# Patient Record
Sex: FEMALE | Race: WHITE | NOT HISPANIC OR LATINO | Employment: FULL TIME | URBAN - METROPOLITAN AREA
[De-identification: names, ages, dates, MRNs, and addresses within clinical notes are randomized per-mention and may not be internally consistent; named-entity substitution may affect disease eponyms.]

---

## 2023-07-21 ENCOUNTER — TELEPHONE (OUTPATIENT)
Dept: PRIMARY CARE | Facility: CLINIC | Age: 58
End: 2023-07-21
Payer: COMMERCIAL

## 2023-07-21 NOTE — TELEPHONE ENCOUNTER
She is 2 yrs post menopause  Last few months has been spotting  She just had period  Should she be concerned

## 2023-07-24 DIAGNOSIS — N95.0 POST-MENOPAUSAL BLEEDING: Primary | ICD-10-CM

## 2023-07-25 NOTE — TELEPHONE ENCOUNTER
Msg given to patient in detail    Can you call and help get her schd for the US and also given the info to the [facility] please.

## 2023-08-02 ENCOUNTER — TELEPHONE (OUTPATIENT)
Dept: PRIMARY CARE | Facility: CLINIC | Age: 58
End: 2023-08-02
Payer: COMMERCIAL

## 2023-08-02 NOTE — TELEPHONE ENCOUNTER
----- Message from Shanell Hood, DO sent at 8/2/2023  8:35 AM EDT -----  Inner layer/endometrium is abnormally thick for a post menopausal women. You should see a gynecologist and will likely need an endometrial biopsy. Pls let us know if you need help setting up an appointment. I recommedn you been seen within a month.  Thanks,  Shanell Hood

## 2023-09-05 ENCOUNTER — HOSPITAL ENCOUNTER (OUTPATIENT)
Dept: DATA CONVERSION | Facility: HOSPITAL | Age: 58
End: 2023-09-05
Attending: ANESTHESIOLOGY | Admitting: ANESTHESIOLOGY
Payer: COMMERCIAL

## 2023-09-05 DIAGNOSIS — M54.16 RADICULOPATHY, LUMBAR REGION: ICD-10-CM

## 2023-09-05 DIAGNOSIS — M48.061 SPINAL STENOSIS, LUMBAR REGION WITHOUT NEUROGENIC CLAUDICATION: ICD-10-CM

## 2023-09-29 VITALS — HEIGHT: 60 IN | WEIGHT: 180.78 LBS | BODY MASS INDEX: 35.49 KG/M2

## 2023-10-12 RX ORDER — GABAPENTIN 600 MG/1
600 TABLET ORAL ONCE
Status: CANCELLED | OUTPATIENT
Start: 2023-10-12 | End: 2023-10-12

## 2023-10-12 RX ORDER — CELECOXIB 200 MG/1
400 CAPSULE ORAL ONCE
Status: CANCELLED | OUTPATIENT
Start: 2023-10-12 | End: 2023-10-12

## 2023-10-12 RX ORDER — ACETAMINOPHEN 325 MG/1
975 TABLET ORAL ONCE
Status: CANCELLED | OUTPATIENT
Start: 2023-10-12 | End: 2023-10-12

## 2023-10-12 NOTE — HOSPITAL COURSE
58yo presenting for TLH-BSO and sLND for endometrial adenocarcinoma (endometrioid type FIGO 1, MMR proficient and p53 wild type).      Tumor Hx:   23: Underwent endometrial biopsy 2/2 an episode of AUB post menopause.   Pathology indicated Endometrial (endometrioid type) FIGO 1, MMR proficient and wild type p53.     US 23:  IMPRESSION:  1.  Thickened endometrium noted measuring up to 10 mm, could be  related to endometrial hyperplasia, other etiologies cannot be  excluded, recommend gyn consultation.    PMH: Sciatica       Past Surgical History: Cervical Disc replacement, Transvaginal mesh       Family History: Father diagnosed with unknown Colon cancer @ 84 , Mother with   hyst for unknown gynecological lesion, Brother w/ unknown cervical cancer.   Otherwise denies a history of gyn related cancers including ovarian,   endometrial, breast, pancreas, and GI cancers.       Social History: Denies a history of smoking, alcohol use or recreational drug use.  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  Entered menopause at 55 year old.  She   has used OCP for 10-13 years.  She has  never used HRT.       Allergies: NKDA  Meds: none

## 2023-10-12 NOTE — H&P
History Of Present Illness  Candi Varner is a 57 y.o. female presenting for TLH-BSO and sLND for endometrial adenocarcinoma (endometrioid type FIGO 1, MMR proficient and p53 wild type).      Tumor Hx:   23: Underwent endometrial biopsy 2/2 an episode of AUB post menopause.   Pathology indicated Endometrial (endometrioid type) FIGO 1, MMR proficient and wild type p53.     US 23:  IMPRESSION:  1.  Thickened endometrium noted measuring up to 10 mm, could be  related to endometrial hyperplasia, other etiologies cannot be  excluded, recommend gyn consultation.    PMH: Sciatica       Past Surgical History: Cervical Disc replacement, Transvaginal mesh       Family History: Father diagnosed with unknown Colon cancer @ 84 , Mother with   hyst for unknown gynecological lesion, Brother w/ unknown cervical cancer.   Otherwise denies a history of gyn related cancers including ovarian,   endometrial, breast, pancreas, and GI cancers.       Social History: Denies a history of smoking, alcohol use or recreational drug use.  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  Entered menopause at 55 year old.  She   has used OCP for 10-13 years.  She has  never used HRT.       Allergies: NKDA    Meds: none    Social: denies t/e/i    Review of Systems   All other systems reviewed and are negative.       Physical Exam  Constitutional:       General: She is not in acute distress.  HENT:      Head: Normocephalic and atraumatic.      Mouth/Throat:      Mouth: Mucous membranes are moist.   Eyes:      Extraocular Movements: Extraocular movements intact.      Pupils: Pupils are equal, round, and reactive to light.   Cardiovascular:      Rate and Rhythm: Normal rate and regular rhythm.   Pulmonary:      Effort: Pulmonary effort is normal.   Musculoskeletal:         General: Normal range of motion.   Skin:     General: Skin is warm and dry.   Neurological:      General: No  focal deficit present.      Mental Status: She is alert.   Psychiatric:         Mood and Affect: Mood normal.          Last Recorded Vitals  There were no vitals taken for this visit.       Assessment/Plan   57 y.o. female presenting for TLH-BSO and sLND for endometrial adenocarcinoma (endometrioid type FIGO 1, MMR proficient and p53 wild type).        Maureen Kendall MD

## 2023-10-13 ENCOUNTER — LAB (OUTPATIENT)
Dept: LAB | Facility: LAB | Age: 58
End: 2023-10-13
Payer: COMMERCIAL

## 2023-10-13 DIAGNOSIS — C54.1 MALIGNANT NEOPLASM OF ENDOMETRIUM (MULTI): Primary | ICD-10-CM

## 2023-10-13 LAB
ABO GROUP (TYPE) IN BLOOD: NORMAL
ALBUMIN SERPL BCP-MCNC: 4.1 G/DL (ref 3.4–5)
ALP SERPL-CCNC: 52 U/L (ref 33–110)
ALT SERPL W P-5'-P-CCNC: 28 U/L (ref 7–45)
ANION GAP SERPL CALC-SCNC: 14 MMOL/L (ref 10–20)
ANTIBODY SCREEN: NORMAL
AST SERPL W P-5'-P-CCNC: 26 U/L (ref 9–39)
BASOPHILS # BLD AUTO: 0.08 X10*3/UL (ref 0–0.1)
BASOPHILS NFR BLD AUTO: 1.1 %
BILIRUB SERPL-MCNC: 0.6 MG/DL (ref 0–1.2)
BUN SERPL-MCNC: 8 MG/DL (ref 6–23)
CALCIUM SERPL-MCNC: 9.5 MG/DL (ref 8.6–10.3)
CHLORIDE SERPL-SCNC: 104 MMOL/L (ref 98–107)
CO2 SERPL-SCNC: 27 MMOL/L (ref 21–32)
CREAT SERPL-MCNC: 0.93 MG/DL (ref 0.5–1.05)
EOSINOPHIL # BLD AUTO: 0.33 X10*3/UL (ref 0–0.7)
EOSINOPHIL NFR BLD AUTO: 4.7 %
ERYTHROCYTE [DISTWIDTH] IN BLOOD BY AUTOMATED COUNT: 13.6 % (ref 11.5–14.5)
GFR SERPL CREATININE-BSD FRML MDRD: 71 ML/MIN/1.73M*2
GLUCOSE SERPL-MCNC: 89 MG/DL (ref 74–99)
HCT VFR BLD AUTO: 43.6 % (ref 36–46)
HGB BLD-MCNC: 14.5 G/DL (ref 12–16)
IMM GRANULOCYTES # BLD AUTO: 0.03 X10*3/UL (ref 0–0.7)
IMM GRANULOCYTES NFR BLD AUTO: 0.4 % (ref 0–0.9)
LYMPHOCYTES # BLD AUTO: 2.14 X10*3/UL (ref 1.2–4.8)
LYMPHOCYTES NFR BLD AUTO: 30.3 %
MCH RBC QN AUTO: 29.5 PG (ref 26–34)
MCHC RBC AUTO-ENTMCNC: 33.3 G/DL (ref 32–36)
MCV RBC AUTO: 89 FL (ref 80–100)
MONOCYTES # BLD AUTO: 0.66 X10*3/UL (ref 0.1–1)
MONOCYTES NFR BLD AUTO: 9.3 %
NEUTROPHILS # BLD AUTO: 3.82 X10*3/UL (ref 1.2–7.7)
NEUTROPHILS NFR BLD AUTO: 54.2 %
NRBC BLD-RTO: 0 /100 WBCS (ref 0–0)
PLATELET # BLD AUTO: 321 X10*3/UL (ref 150–450)
PMV BLD AUTO: 10.4 FL (ref 7.5–11.5)
POTASSIUM SERPL-SCNC: 4.8 MMOL/L (ref 3.5–5.3)
PROT SERPL-MCNC: 7.1 G/DL (ref 6.4–8.2)
RBC # BLD AUTO: 4.91 X10*6/UL (ref 4–5.2)
RH FACTOR (ANTIGEN D): NORMAL
SODIUM SERPL-SCNC: 140 MMOL/L (ref 136–145)
WBC # BLD AUTO: 7.1 X10*3/UL (ref 4.4–11.3)

## 2023-10-13 PROCEDURE — 86901 BLOOD TYPING SEROLOGIC RH(D): CPT

## 2023-10-13 PROCEDURE — 36415 COLL VENOUS BLD VENIPUNCTURE: CPT

## 2023-10-13 PROCEDURE — 86850 RBC ANTIBODY SCREEN: CPT

## 2023-10-13 PROCEDURE — 80053 COMPREHEN METABOLIC PANEL: CPT

## 2023-10-13 PROCEDURE — 85025 COMPLETE CBC W/AUTO DIFF WBC: CPT

## 2023-10-13 PROCEDURE — 86900 BLOOD TYPING SEROLOGIC ABO: CPT

## 2023-10-14 ENCOUNTER — ANESTHESIA EVENT (OUTPATIENT)
Dept: OPERATING ROOM | Facility: HOSPITAL | Age: 58
End: 2023-10-14
Payer: COMMERCIAL

## 2023-10-16 ENCOUNTER — HOSPITAL ENCOUNTER (OUTPATIENT)
Facility: HOSPITAL | Age: 58
Discharge: HOME | End: 2023-10-17
Attending: STUDENT IN AN ORGANIZED HEALTH CARE EDUCATION/TRAINING PROGRAM | Admitting: STUDENT IN AN ORGANIZED HEALTH CARE EDUCATION/TRAINING PROGRAM
Payer: COMMERCIAL

## 2023-10-16 ENCOUNTER — ANESTHESIA (OUTPATIENT)
Dept: OPERATING ROOM | Facility: HOSPITAL | Age: 58
End: 2023-10-16
Payer: COMMERCIAL

## 2023-10-16 DIAGNOSIS — C54.9 MALIGNANT NEOPLASM OF CORPUS UTERI, UNSPECIFIED (MULTI): ICD-10-CM

## 2023-10-16 DIAGNOSIS — G89.18 POST-OP PAIN: Primary | ICD-10-CM

## 2023-10-16 PROBLEM — Z90.710 STATUS POST HYSTERECTOMY: Status: ACTIVE | Noted: 2023-10-16

## 2023-10-16 LAB — SARS-COV-2 RNA RESP QL NAA+PROBE: NOT DETECTED

## 2023-10-16 PROCEDURE — 99223 1ST HOSP IP/OBS HIGH 75: CPT | Performed by: STUDENT IN AN ORGANIZED HEALTH CARE EDUCATION/TRAINING PROGRAM

## 2023-10-16 PROCEDURE — 96372 THER/PROPH/DIAG INJ SC/IM: CPT | Mod: 59

## 2023-10-16 PROCEDURE — 87635 SARS-COV-2 COVID-19 AMP PRB: CPT | Mod: CMCLAB

## 2023-10-16 PROCEDURE — 3600000004 HC OR TIME - INITIAL BASE CHARGE - PROCEDURE LEVEL FOUR: Performed by: STUDENT IN AN ORGANIZED HEALTH CARE EDUCATION/TRAINING PROGRAM

## 2023-10-16 PROCEDURE — 38570 LAPAROSCOPY LYMPH NODE BIOP: CPT | Performed by: STUDENT IN AN ORGANIZED HEALTH CARE EDUCATION/TRAINING PROGRAM

## 2023-10-16 PROCEDURE — G0378 HOSPITAL OBSERVATION PER HR: HCPCS

## 2023-10-16 PROCEDURE — 2500000004 HC RX 250 GENERAL PHARMACY W/ HCPCS (ALT 636 FOR OP/ED)

## 2023-10-16 PROCEDURE — 3700000002 HC GENERAL ANESTHESIA TIME - EACH INCREMENTAL 1 MINUTE: Performed by: STUDENT IN AN ORGANIZED HEALTH CARE EDUCATION/TRAINING PROGRAM

## 2023-10-16 PROCEDURE — 2580000001 HC RX 258 IV SOLUTIONS

## 2023-10-16 PROCEDURE — 2500000004 HC RX 250 GENERAL PHARMACY W/ HCPCS (ALT 636 FOR OP/ED): Performed by: STUDENT IN AN ORGANIZED HEALTH CARE EDUCATION/TRAINING PROGRAM

## 2023-10-16 PROCEDURE — 88309 TISSUE EXAM BY PATHOLOGIST: CPT | Mod: TC,SUR | Performed by: STUDENT IN AN ORGANIZED HEALTH CARE EDUCATION/TRAINING PROGRAM

## 2023-10-16 PROCEDURE — 7100000002 HC RECOVERY ROOM TIME - EACH INCREMENTAL 1 MINUTE: Performed by: STUDENT IN AN ORGANIZED HEALTH CARE EDUCATION/TRAINING PROGRAM

## 2023-10-16 PROCEDURE — 3700000001 HC GENERAL ANESTHESIA TIME - INITIAL BASE CHARGE: Performed by: STUDENT IN AN ORGANIZED HEALTH CARE EDUCATION/TRAINING PROGRAM

## 2023-10-16 PROCEDURE — A58571 PR LAPAROSCOPY W TOT HYSTERECTUTERUS <=250 GRAM  W TUBE/OVARY: Performed by: ANESTHESIOLOGY

## 2023-10-16 PROCEDURE — 7100000001 HC RECOVERY ROOM TIME - INITIAL BASE CHARGE: Performed by: STUDENT IN AN ORGANIZED HEALTH CARE EDUCATION/TRAINING PROGRAM

## 2023-10-16 PROCEDURE — 94640 AIRWAY INHALATION TREATMENT: CPT | Mod: 59

## 2023-10-16 PROCEDURE — 2500000005 HC RX 250 GENERAL PHARMACY W/O HCPCS

## 2023-10-16 PROCEDURE — 7100000009 HC PHASE TWO TIME - INITIAL BASE CHARGE: Performed by: STUDENT IN AN ORGANIZED HEALTH CARE EDUCATION/TRAINING PROGRAM

## 2023-10-16 PROCEDURE — 2500000001 HC RX 250 WO HCPCS SELF ADMINISTERED DRUGS (ALT 637 FOR MEDICARE OP)

## 2023-10-16 PROCEDURE — 58571 TLH W/T/O 250 G OR LESS: CPT | Performed by: STUDENT IN AN ORGANIZED HEALTH CARE EDUCATION/TRAINING PROGRAM

## 2023-10-16 PROCEDURE — 2500000002 HC RX 250 W HCPCS SELF ADMINISTERED DRUGS (ALT 637 FOR MEDICARE OP, ALT 636 FOR OP/ED)

## 2023-10-16 PROCEDURE — 88307 TISSUE EXAM BY PATHOLOGIST: CPT | Performed by: PATHOLOGY

## 2023-10-16 PROCEDURE — 88309 TISSUE EXAM BY PATHOLOGIST: CPT | Performed by: PATHOLOGY

## 2023-10-16 PROCEDURE — 3600000009 HC OR TIME - EACH INCREMENTAL 1 MINUTE - PROCEDURE LEVEL FOUR: Performed by: STUDENT IN AN ORGANIZED HEALTH CARE EDUCATION/TRAINING PROGRAM

## 2023-10-16 PROCEDURE — 2500000005 HC RX 250 GENERAL PHARMACY W/O HCPCS: Performed by: STUDENT IN AN ORGANIZED HEALTH CARE EDUCATION/TRAINING PROGRAM

## 2023-10-16 PROCEDURE — 2500000001 HC RX 250 WO HCPCS SELF ADMINISTERED DRUGS (ALT 637 FOR MEDICARE OP): Performed by: STUDENT IN AN ORGANIZED HEALTH CARE EDUCATION/TRAINING PROGRAM

## 2023-10-16 PROCEDURE — 7100000010 HC PHASE TWO TIME - EACH INCREMENTAL 1 MINUTE: Performed by: STUDENT IN AN ORGANIZED HEALTH CARE EDUCATION/TRAINING PROGRAM

## 2023-10-16 PROCEDURE — 76942 ECHO GUIDE FOR BIOPSY: CPT | Performed by: STUDENT IN AN ORGANIZED HEALTH CARE EDUCATION/TRAINING PROGRAM

## 2023-10-16 PROCEDURE — A4217 STERILE WATER/SALINE, 500 ML: HCPCS | Performed by: STUDENT IN AN ORGANIZED HEALTH CARE EDUCATION/TRAINING PROGRAM

## 2023-10-16 PROCEDURE — 2720000007 HC OR 272 NO HCPCS: Performed by: STUDENT IN AN ORGANIZED HEALTH CARE EDUCATION/TRAINING PROGRAM

## 2023-10-16 RX ORDER — ROCURONIUM BROMIDE 10 MG/ML
INJECTION, SOLUTION INTRAVENOUS AS NEEDED
Status: DISCONTINUED | OUTPATIENT
Start: 2023-10-16 | End: 2023-10-16

## 2023-10-16 RX ORDER — ONDANSETRON HYDROCHLORIDE 2 MG/ML
4 INJECTION, SOLUTION INTRAVENOUS ONCE AS NEEDED
Status: DISCONTINUED | OUTPATIENT
Start: 2023-10-16 | End: 2023-10-16 | Stop reason: HOSPADM

## 2023-10-16 RX ORDER — SODIUM CHLORIDE, SODIUM LACTATE, POTASSIUM CHLORIDE, CALCIUM CHLORIDE 600; 310; 30; 20 MG/100ML; MG/100ML; MG/100ML; MG/100ML
100 INJECTION, SOLUTION INTRAVENOUS CONTINUOUS
Status: DISCONTINUED | OUTPATIENT
Start: 2023-10-16 | End: 2023-10-16 | Stop reason: HOSPADM

## 2023-10-16 RX ORDER — OXYCODONE HYDROCHLORIDE 5 MG/1
5 TABLET ORAL EVERY 4 HOURS PRN
Status: DISCONTINUED | OUTPATIENT
Start: 2023-10-16 | End: 2023-10-16 | Stop reason: HOSPADM

## 2023-10-16 RX ORDER — ACETAMINOPHEN 325 MG/1
975 TABLET ORAL ONCE
Status: COMPLETED | OUTPATIENT
Start: 2023-10-16 | End: 2023-10-16

## 2023-10-16 RX ORDER — MIDAZOLAM HYDROCHLORIDE 1 MG/ML
INJECTION INTRAMUSCULAR; INTRAVENOUS AS NEEDED
Status: DISCONTINUED | OUTPATIENT
Start: 2023-10-16 | End: 2023-10-16

## 2023-10-16 RX ORDER — GABAPENTIN 600 MG/1
600 TABLET ORAL ONCE
Status: COMPLETED | OUTPATIENT
Start: 2023-10-16 | End: 2023-10-16

## 2023-10-16 RX ORDER — ONDANSETRON HYDROCHLORIDE 2 MG/ML
INJECTION, SOLUTION INTRAVENOUS AS NEEDED
Status: DISCONTINUED | OUTPATIENT
Start: 2023-10-16 | End: 2023-10-16

## 2023-10-16 RX ORDER — ACETAMINOPHEN 325 MG/1
650 TABLET ORAL EVERY 6 HOURS PRN
Qty: 20 TABLET | Refills: 0 | Status: SHIPPED | OUTPATIENT
Start: 2023-10-16 | End: 2023-10-26

## 2023-10-16 RX ORDER — HYDROMORPHONE HYDROCHLORIDE 1 MG/ML
0.2 INJECTION, SOLUTION INTRAMUSCULAR; INTRAVENOUS; SUBCUTANEOUS EVERY 5 MIN PRN
Status: DISCONTINUED | OUTPATIENT
Start: 2023-10-16 | End: 2023-10-16 | Stop reason: HOSPADM

## 2023-10-16 RX ORDER — ALBUTEROL SULFATE 0.83 MG/ML
2.5 SOLUTION RESPIRATORY (INHALATION) ONCE AS NEEDED
Status: COMPLETED | OUTPATIENT
Start: 2023-10-16 | End: 2023-10-16

## 2023-10-16 RX ORDER — SODIUM CHLORIDE 0.9 G/100ML
IRRIGANT IRRIGATION AS NEEDED
Status: DISCONTINUED | OUTPATIENT
Start: 2023-10-16 | End: 2023-10-16 | Stop reason: HOSPADM

## 2023-10-16 RX ORDER — CELECOXIB 200 MG/1
400 CAPSULE ORAL ONCE
Status: COMPLETED | OUTPATIENT
Start: 2023-10-16 | End: 2023-10-16

## 2023-10-16 RX ORDER — DEXAMETHASONE SODIUM PHOSPHATE 4 MG/ML
INJECTION, SOLUTION INTRA-ARTICULAR; INTRALESIONAL; INTRAMUSCULAR; INTRAVENOUS; SOFT TISSUE AS NEEDED
Status: DISCONTINUED | OUTPATIENT
Start: 2023-10-16 | End: 2023-10-16

## 2023-10-16 RX ORDER — LIDOCAINE HYDROCHLORIDE 20 MG/ML
INJECTION, SOLUTION INFILTRATION; PERINEURAL AS NEEDED
Status: DISCONTINUED | OUTPATIENT
Start: 2023-10-16 | End: 2023-10-16

## 2023-10-16 RX ORDER — IBUPROFEN 600 MG/1
600 TABLET ORAL EVERY 6 HOURS PRN
Qty: 20 TABLET | Refills: 0 | Status: SHIPPED | OUTPATIENT
Start: 2023-10-16 | End: 2023-10-26

## 2023-10-16 RX ORDER — CEFAZOLIN SODIUM 1 G/50ML
1 SOLUTION INTRAVENOUS ONCE
Status: COMPLETED | OUTPATIENT
Start: 2023-10-16 | End: 2023-10-16

## 2023-10-16 RX ORDER — HEPARIN SODIUM 5000 [USP'U]/ML
5000 INJECTION, SOLUTION INTRAVENOUS; SUBCUTANEOUS ONCE
Status: COMPLETED | OUTPATIENT
Start: 2023-10-16 | End: 2023-10-16

## 2023-10-16 RX ORDER — HYDRALAZINE HYDROCHLORIDE 20 MG/ML
5 INJECTION INTRAMUSCULAR; INTRAVENOUS EVERY 30 MIN PRN
Status: DISCONTINUED | OUTPATIENT
Start: 2023-10-16 | End: 2023-10-16 | Stop reason: HOSPADM

## 2023-10-16 RX ORDER — INDOCYANINE GREEN AND WATER 25 MG
KIT INJECTION AS NEEDED
Status: DISCONTINUED | OUTPATIENT
Start: 2023-10-16 | End: 2023-10-16 | Stop reason: HOSPADM

## 2023-10-16 RX ORDER — PHENYLEPHRINE 10 MG/250 ML(40 MCG/ML)IN 0.9 % SOD.CHLORIDE INTRAVENOUS
CONTINUOUS PRN
Status: DISCONTINUED | OUTPATIENT
Start: 2023-10-16 | End: 2023-10-16

## 2023-10-16 RX ORDER — FENTANYL CITRATE 50 UG/ML
INJECTION, SOLUTION INTRAMUSCULAR; INTRAVENOUS AS NEEDED
Status: DISCONTINUED | OUTPATIENT
Start: 2023-10-16 | End: 2023-10-16

## 2023-10-16 RX ORDER — HYDROMORPHONE HYDROCHLORIDE 1 MG/ML
INJECTION, SOLUTION INTRAMUSCULAR; INTRAVENOUS; SUBCUTANEOUS AS NEEDED
Status: DISCONTINUED | OUTPATIENT
Start: 2023-10-16 | End: 2023-10-16

## 2023-10-16 RX ORDER — PROPOFOL 10 MG/ML
INJECTION, EMULSION INTRAVENOUS AS NEEDED
Status: DISCONTINUED | OUTPATIENT
Start: 2023-10-16 | End: 2023-10-16

## 2023-10-16 RX ORDER — WATER 1 ML/ML
IRRIGANT IRRIGATION AS NEEDED
Status: DISCONTINUED | OUTPATIENT
Start: 2023-10-16 | End: 2023-10-16 | Stop reason: HOSPADM

## 2023-10-16 RX ORDER — HYDROMORPHONE HYDROCHLORIDE 1 MG/ML
0.5 INJECTION, SOLUTION INTRAMUSCULAR; INTRAVENOUS; SUBCUTANEOUS EVERY 5 MIN PRN
Status: DISCONTINUED | OUTPATIENT
Start: 2023-10-16 | End: 2023-10-16 | Stop reason: HOSPADM

## 2023-10-16 RX ORDER — LABETALOL HYDROCHLORIDE 5 MG/ML
5 INJECTION, SOLUTION INTRAVENOUS ONCE AS NEEDED
Status: DISCONTINUED | OUTPATIENT
Start: 2023-10-16 | End: 2023-10-16 | Stop reason: HOSPADM

## 2023-10-16 RX ORDER — INDOCYANINE GREEN AND WATER 25 MG
5 KIT INJECTION ONCE
Status: CANCELLED | OUTPATIENT
Start: 2023-10-16 | End: 2023-10-16

## 2023-10-16 RX ORDER — SODIUM CHLORIDE, SODIUM LACTATE, POTASSIUM CHLORIDE, CALCIUM CHLORIDE 600; 310; 30; 20 MG/100ML; MG/100ML; MG/100ML; MG/100ML
INJECTION, SOLUTION INTRAVENOUS CONTINUOUS PRN
Status: DISCONTINUED | OUTPATIENT
Start: 2023-10-16 | End: 2023-10-16

## 2023-10-16 RX ORDER — TRAMADOL HYDROCHLORIDE 50 MG/1
50 TABLET ORAL EVERY 6 HOURS PRN
Qty: 12 TABLET | Refills: 0 | Status: SHIPPED | OUTPATIENT
Start: 2023-10-16 | End: 2023-10-23

## 2023-10-16 RX ORDER — PHENYLEPHRINE HCL IN 0.9% NACL 0.4MG/10ML
SYRINGE (ML) INTRAVENOUS AS NEEDED
Status: DISCONTINUED | OUTPATIENT
Start: 2023-10-16 | End: 2023-10-16

## 2023-10-16 RX ADMIN — Medication 40 MCG: at 14:15

## 2023-10-16 RX ADMIN — SODIUM CHLORIDE, POTASSIUM CHLORIDE, SODIUM LACTATE AND CALCIUM CHLORIDE: 600; 310; 30; 20 INJECTION, SOLUTION INTRAVENOUS at 12:37

## 2023-10-16 RX ADMIN — ACETAMINOPHEN 975 MG: 325 TABLET ORAL at 10:41

## 2023-10-16 RX ADMIN — Medication 80 MCG: at 13:38

## 2023-10-16 RX ADMIN — ROCURONIUM BROMIDE 20 MG: 10 INJECTION, SOLUTION INTRAVENOUS at 13:44

## 2023-10-16 RX ADMIN — SODIUM CHLORIDE, POTASSIUM CHLORIDE, SODIUM LACTATE AND CALCIUM CHLORIDE 100 ML/HR: 600; 310; 30; 20 INJECTION, SOLUTION INTRAVENOUS at 16:00

## 2023-10-16 RX ADMIN — Medication 120 MCG: at 13:57

## 2023-10-16 RX ADMIN — Medication 80 MCG: at 13:10

## 2023-10-16 RX ADMIN — PROPOFOL 130 MG: 10 INJECTION, EMULSION INTRAVENOUS at 12:56

## 2023-10-16 RX ADMIN — CELECOXIB 400 MG: 200 CAPSULE ORAL at 10:41

## 2023-10-16 RX ADMIN — Medication 0.2 MCG/KG/MIN: at 14:10

## 2023-10-16 RX ADMIN — DEXAMETHASONE SODIUM PHOSPHATE 4 MG: 4 INJECTION, SOLUTION INTRAMUSCULAR; INTRAVENOUS at 12:58

## 2023-10-16 RX ADMIN — Medication 40 MCG: at 15:42

## 2023-10-16 RX ADMIN — HYDROMORPHONE HYDROCHLORIDE 0.4 MG: 1 INJECTION, SOLUTION INTRAMUSCULAR; INTRAVENOUS; SUBCUTANEOUS at 15:37

## 2023-10-16 RX ADMIN — GABAPENTIN 600 MG: 600 TABLET, FILM COATED ORAL at 10:30

## 2023-10-16 RX ADMIN — Medication 120 MCG: at 15:45

## 2023-10-16 RX ADMIN — Medication 120 MCG: at 13:14

## 2023-10-16 RX ADMIN — LIDOCAINE HYDROCHLORIDE 60 ML: 20 INJECTION, SOLUTION INFILTRATION; PERINEURAL at 12:55

## 2023-10-16 RX ADMIN — Medication 80 MCG: at 13:51

## 2023-10-16 RX ADMIN — LIDOCAINE HYDROCHLORIDE 40 ML: 20 INJECTION, SOLUTION INFILTRATION; PERINEURAL at 12:56

## 2023-10-16 RX ADMIN — HYDROMORPHONE HYDROCHLORIDE 0.2 MG: 1 INJECTION, SOLUTION INTRAMUSCULAR; INTRAVENOUS; SUBCUTANEOUS at 17:04

## 2023-10-16 RX ADMIN — HYDROMORPHONE HYDROCHLORIDE 0.4 MG: 1 INJECTION, SOLUTION INTRAMUSCULAR; INTRAVENOUS; SUBCUTANEOUS at 14:53

## 2023-10-16 RX ADMIN — Medication 200 MCG: at 14:03

## 2023-10-16 RX ADMIN — ROCURONIUM BROMIDE 5 MG: 10 INJECTION, SOLUTION INTRAVENOUS at 15:18

## 2023-10-16 RX ADMIN — Medication 6 L/MIN: at 16:00

## 2023-10-16 RX ADMIN — FENTANYL CITRATE 50 MCG: 50 INJECTION, SOLUTION INTRAMUSCULAR; INTRAVENOUS at 12:54

## 2023-10-16 RX ADMIN — MIDAZOLAM HYDROCHLORIDE 2 MG: 1 INJECTION, SOLUTION INTRAMUSCULAR; INTRAVENOUS at 12:37

## 2023-10-16 RX ADMIN — Medication 120 MCG: at 14:19

## 2023-10-16 RX ADMIN — ONDANSETRON 4 MG: 2 INJECTION INTRAMUSCULAR; INTRAVENOUS at 15:42

## 2023-10-16 RX ADMIN — HEPARIN SODIUM 5000 UNITS: 5000 INJECTION, SOLUTION INTRAVENOUS; SUBCUTANEOUS at 10:41

## 2023-10-16 RX ADMIN — ROCURONIUM BROMIDE 10 MG: 10 INJECTION, SOLUTION INTRAVENOUS at 14:36

## 2023-10-16 RX ADMIN — Medication 240 MCG: at 15:48

## 2023-10-16 RX ADMIN — ALBUTEROL SULFATE 2.5 MG: 2.5 SOLUTION RESPIRATORY (INHALATION) at 18:03

## 2023-10-16 RX ADMIN — ROCURONIUM BROMIDE 55 MG: 10 INJECTION, SOLUTION INTRAVENOUS at 12:57

## 2023-10-16 RX ADMIN — SUGAMMADEX 200 MG: 100 INJECTION, SOLUTION INTRAVENOUS at 15:42

## 2023-10-16 RX ADMIN — Medication 3 L/MIN: at 19:15

## 2023-10-16 RX ADMIN — CEFAZOLIN SODIUM 2 G: 1 INJECTION, SOLUTION INTRAVENOUS at 13:16

## 2023-10-16 SDOH — HEALTH STABILITY: MENTAL HEALTH: CURRENT SMOKER: 0

## 2023-10-16 ASSESSMENT — PAIN SCALES - GENERAL
PAINLEVEL_OUTOF10: 3
PAINLEVEL_OUTOF10: 3
PAINLEVEL_OUTOF10: 0 - NO PAIN
PAINLEVEL_OUTOF10: 2
PAINLEVEL_OUTOF10: 3
PAINLEVEL_OUTOF10: 5 - MODERATE PAIN
PAINLEVEL_OUTOF10: 2
PAINLEVEL_OUTOF10: 3

## 2023-10-16 ASSESSMENT — COLUMBIA-SUICIDE SEVERITY RATING SCALE - C-SSRS
1. IN THE PAST MONTH, HAVE YOU WISHED YOU WERE DEAD OR WISHED YOU COULD GO TO SLEEP AND NOT WAKE UP?: NO
2. HAVE YOU ACTUALLY HAD ANY THOUGHTS OF KILLING YOURSELF?: NO
6. HAVE YOU EVER DONE ANYTHING, STARTED TO DO ANYTHING, OR PREPARED TO DO ANYTHING TO END YOUR LIFE?: NO

## 2023-10-16 ASSESSMENT — PAIN - FUNCTIONAL ASSESSMENT
PAIN_FUNCTIONAL_ASSESSMENT: 0-10
PAIN_FUNCTIONAL_ASSESSMENT: UNABLE TO SELF-REPORT
PAIN_FUNCTIONAL_ASSESSMENT: 0-10

## 2023-10-16 NOTE — ANESTHESIA POSTPROCEDURE EVALUATION
Patient: Candi Varner    Procedure Summary       Date: 10/16/23 Room / Location: [facility] OR 03 / [facility]    Anesthesia Start: 1237 Anesthesia Stop: 1607    Procedure: Total laparoscopic hysterectomy; bilateral salpingo oophorectomy; sentinel nodes - roderick/nc 9/25 Diagnosis:       Malignant neoplasm of corpus uteri, unspecified (CMS/HCC)      (Malignant neoplasm of corpus uteri, unspecified (CMS/HCC) [C54.9])    Surgeons: Tonya Martinez MD Responsible Provider: Grace Calvin MD    Anesthesia Type: general ASA Status: 2            Anesthesia Type: general    Vitals Value Taken Time   /65 10/16/23 1607   Temp 36.1 10/16/23 1607   Pulse 75 10/16/23 1607   Resp 14 10/16/23 1607   SpO2 99% 10/16/23 1607       Anesthesia Post Evaluation    Patient location during evaluation: PACU  Patient participation: complete - patient participated  Level of consciousness: sleepy but conscious  Pain management: adequate  Airway patency: patent  Cardiovascular status: acceptable  Respiratory status: acceptable  Hydration status: acceptable        No notable events documented.

## 2023-10-16 NOTE — CONSULTS
Consults  Acute Pain Service    Candi Varner is a 57 y.o. year old female patient presenting for total laparoscopic hysterectomy BSO with Dr. Martinez 10/16/23.    PMH:  Endometrial adenocarcinoma, sciatica     PSH:  Cervical disc replacement    FHx:  cancer    SHx:  Denies smoking, EtOH, illicit drug use    Allergies: NDKA    Review of Systems  Gen: No fatigue, anorexia, insomnia, fever.   Eyes: No vision loss, double vision, drainage, eye pain.   ENT: No pharyngitis, dry mouth, no hearing changes or ear discharge  Cardiac: No chest pain, palpitations, syncope, near syncope.   Pulmonary: No shortness of breath, cough, hemoptysis.   Heme/lymph: No swollen glands, fever, bleeding.   GI: No abdominal pain, change in bowel habits, melena, hematemesis, hematochezia, nausea, vomiting, diarrhea.   : No discharge, dysuria, frequency, urgency, hematuria.  Endo: No polyuria or weight loss.   Musculoskeletal: Negative for any pain or loss of ROM/weakness  Skin: No rashes or lesions  Neuro: Normal speech, no numbness or weakness. No gait difficulties  Review of systems is otherwise negative unless stated above or in history of present illness.    Physical Exam:  Constitutional:  no distress, alert and cooperative  Eyes: clear sclera  Head/Neck: No apparent injury, trachea midline  Respiratory/Thorax: Patent airways, thorax symmetric, breathing comfortably  Cardiovascular: no pitting edema  Gastrointestinal: Nondistended, obese  Musculoskeletal: ROM intact  Extremities: no clubbing  Neurological: alert, jo x4  Psychological: Appropriate affect    No results found for this or any previous visit (from the past 24 hour(s)).     Candi Varner is a 57 y.o. year old female patient presenting for total laparoscopic hysterectomy BSO with Dr. Martinez 10/16/23.    PLAN  -Bilateral quadratus lumborum single shot nerve blocks performed preoperatively.   -Rest of pain medications per primary service  -Acute pain service will see on  POD1 if inpatient    Acute Pain Service Resident  pg [phone]  [phone]

## 2023-10-16 NOTE — ANESTHESIA PROCEDURE NOTES
Peripheral IV  Date/Time: 10/16/2023 1:01 PM  Inserted by: AFIA Adrian    Placement  Needle size: 18 G  Laterality: right  Location: hand  Site prep: alcohol  Technique: anatomical landmarks  Attempts: 1

## 2023-10-16 NOTE — DISCHARGE INSTRUCTIONS
Laparoscopic Hysterectomy Discharge Instructions  If you have any questions about your care, please contact us at [phone].    Medications and Pain Management  Common areas of pain after laparoscopic hysterectomy include the incision pain, pain in between your shoulder blades, the pelvis and lower back. The gas that was used to distend your abdomen for the surgery is absorbed slowly into your blood stream over the first 3-4 days after surgery. It is not passed intestinally, although, because your abdomen is distended, it may feel similar to intestinal gas. Staying active and walking is the best way to promote the absorption of this gas.  Immediately after surgery, nerve pain is the most intense, typically for the first 6 to 12 hours. As the body heals, it creates inflammation around the incisions sites adding pressure and creating soreness. After 5 days, the inflammation begins to recede and significant improvement in soreness is expected. Pulling on the incisions, especially if sudden, such as when you cough, will reactivate the nerve pain. Support your abdomen with a pillow during coughing or sneezing as this will be helpful to minimize pain. There are two types of pain pills typically used for post-operative pain management, narcotics such as tramadol and an anti-inflammatory such as Ibuprofen or Naprosyn.  Taking regular anti-inflammatory pills, such as 3 200mg liquid gel caps of Ibuprofen every 6 hours for the first 5 days and then as needed is recommended. You can alternate ibuprofen with tylenol (650mg). The tylenol can be taken every 6 hours.  If you have problems using NSAIDs, be sure to discuss this with your doctor. The narcotic can be used on a schedule for the first 1 to 2 days but after that, only as you need it. Narcotics can cause constipation, nausea, sleepiness and headaches. You may begin your usual home medications as you were taking before unless directed by your doctor.    Incisional  care  Paper tape steri-strips are typically used for the abdominal incisions. The steri-strips can be pulled off after several days or at your first post-operative appointment. You may shower and use a mild soap around the incisions and pat dry. Do not use a washcloth or scrub the incisions. Using peroxide or antiseptics is not recommended for routine care. Avoid hot and steamy showers as this may cause you to feel faint. No tub baths for six weeks following surgery. There may be discoloration or bruising around the incisions. This is normal and may take several weeks to resolve. Firmness or a nodular area under the skin near the incision may represent a collection of blood, this too will resolve on its own after a little time. If any incision develops tenderness, redness in the skin layer or has drainage please call the office.    Vaginal Discharge  You may have a mildly malodorous discharge and occasional spotting for up to 6 weeks. Menstrual level bleeding or significant watery discharge is not expected. Put nothing in the vagina like tampons or sexual intercourse for 6 weeks after surgery.     GI Function  Nausea can occasionally be an issue in the first few days after surgery. It is usually caused as a side effect from the pain medicine, particularly narcotics. Taking the pain pills with food is a food way to proactively minimize this. Throwing up, especially after the first day, is not expected and if this happens, you should call your doctor. Feeling gassy and constipation can be a problem for the first week after surgery. Limiting the use of narcotics may be helpful. Stool softeners twice a day and a high fiber diet are safe. If needed, Miralax once daily is a good choice. If no bowel movement after 3 days, you will need to increase the Miralax until soft, regular bowel movements are passing.    Voiding  Because the bladder is disturbed by the surgery, the normal sensation may be temporarily altered. You may  not be aware that your bladder is full. If the bladder is allowed to get over distended, it may make the problem worse. This is why we make sure that you are able to empty your bladder adequately before you go home. For the first few days at home, you should make a point to empty your bladder every 3 to 4 hours. If you had a sling placed, it may take longer to empty your bladder and you may have to tilt forward while voiding. After a few weeks, voiding feels much more natural. Pain with voiding, especially after the first day, is not expected and may represent a bladder infection.    Activity  For the first two days post-operatively, your soreness and recovery from anesthesia will limit your activity. At a minimum during this time, you should walk around for 10-15 minutes every 2-3 hours. After that, in the first week, any activity except for overt exercise is ok. Stairs are safe, just take your time. During the first week you should not commit to being on your feet for more than 30 minutes at a time. During the second week, light exercise is encouraged. After 2 weeks from surgery, you should try to get back into regular activity other than heavy lifting. For healing, please limit the amount of weight lifted to 8-10 pounds for the first 6 weeks after surgery. Driving is ok after the first few days as long as you are not taking narcotic pain medications and your pain is well controlled.    When to Call the Doctor  Call for any fever above 100.4 F (If you do not feel feverish you do not have to routinely  check your temperature.  Call for severe pain not improved by medications  Call for persistent nausea, vomiting  Call for menstrual level or more vaginal bleeding or significant vaginal discharge  Call for unusual swelling in your legs  Call if the incisions develop painful redness and discharge.

## 2023-10-16 NOTE — OP NOTE
Date: 10/16/2023  OR Location: [facility] OR    Name: Candi Varner, : 1965, Age: 57 y.o., MRN: [id number], Sex: female    Diagnosis  Pre-op Diagnosis     * Malignant neoplasm of corpus uteri, unspecified (CMS/HCC) [C54.9] Post-op Diagnosis     * Malignant neoplasm of corpus uteri, unspecified (CMS/HCC) [C54.9]     Procedures    * Total laparoscopic hysterectomy; bilateral salpingo oophorectomy; sentinel nodes - roderick/nc     Surgeons      * Tonya Martinez - Primary    Resident/Fellow/Other Assistant:  Fellow: Khanh Enamorado MD  Surgical Resident: Maria Antonia Garcia MD; Laly Centeno MD    Procedure Summary  Anesthesia: General  ASA: II  Anesthesia Staff: Anesthesiologist: Grace Calvin MD  Anesthesia Resident: Vimal Melendez MD  Estimated Blood Loss: 50mL  Intra-op Medications:   Medication Name Total Dose   surgical lubricant gel 1 Application   sterile water irrigation solution 1,000 mL   indocyanine green (IC-Green) injection 25 mg   acetaminophen (Tylenol) tablet 975 mg 975 mg   ceFAZolin in dextrose (iso-os) (Ancef) IVPB 1 g 2 g   celecoxib (CeleBREX) capsule 400 mg 400 mg   heparin (porcine) injection 5,000 Units 5,000 Units              Anesthesia Record               Intraprocedure I/O Totals          Intake    Phenylephrine Drip 0.00 mL    The total shown is the total volume documented since Anesthesia Start was filed.    lactated Ringer's 2200.00 mL    Total Intake 2200 mL       Output    Urine 575 mL    Est. Blood Loss 50 mL    Total Output 625 mL       Net    Net Volume 1575 mL          Specimen:   ID Type Source Tests Collected by Time   1 : RIGHT PELVIC SENTINEL LYMPH NODE Tissue SENTINEL LYMPH NODE, PELVIC SURGICAL PATHOLOGY EXAM Tonya Martinez MD 10/16/2023 1359   2 : LEFT PELVIC SENTINEL LYMPH NODE Tissue SENTINEL LYMPH NODE, PELVIC SURGICAL PATHOLOGY EXAM Tonya Martinez MD 10/16/2023 1415   3 : UTERUS, CERVIX, BILATERAL TUBES AND OVARIES Tissue UTERUS, CERVIX,  FALLOPIAN TUBES AND OVARIES BILATERAL SURGICAL PATHOLOGY EXAM Tonya Martinez MD 10/16/2023 0818        Staff:   Circulator: Little Montague RN; Tanya Ingram RN  Relief Circulator: Shanell Nathan RN  Scrub Person: Ethel Ernandez         Procedure Details:  The patient was seen in the preoperative area. The site of surgery was properly noted/marked if necessary per policy. The patient has been actively warmed in preoperative area. Preoperative antibiotics have been ordered and given within 1 hours of incision. Venous thrombosis prophylaxis have been ordered including bilateral sequential compression devices and chemical prophylaxis    Findings: normal appearing uterus, bilateral fallopian tubes and ovaries. Bilateral mapping of sentinel lymph nodes to the obturator space. Smooth peritoneal surfaces, normal liver edge.     Complications:  None; patient tolerated the procedure well.     Disposition: PACU - hemodynamically stable.  Condition: stable    After informed consent was confirmed, the patient was taken to the operating room with an IV in place. A preoperative Huddle and timeout were performed, with all OR personnel confirming correct patient and procedure. The patient was moved to the operating room table and SCDs were placed.  General anesthesia was induced. She was then placed in the dorsal lithotomy position on the operating room table using Dimas stirrups. She was prepped and draped in a normal sterile fashion for a laparoscopic hysterectomy. A surgical pause was performed. The patient's identity and surgical procedure were again confirmed by all the surgical personnel. The patient received preoperative antibiotics.    A guillen catheter was placed. We then started with the vaginal portion of the operation. A bivalve speculum was placed in the vagina, and the cervix was visualized.  Indocyanine green dye was then injected at 3 o’clock and 9 o’clock, 1cc superficially and 1cc deep at each  location, for a total of 4cc.  A [employer] system was then placed as a uterine manipulator.  The speculum was then removed.      We then turned our attention to the laparoscopic portion of the operation after donning new sterile gloves. The skin superior to the supraumbilical area was marked. A 12mm horizontal skin incision was made. This was carried down to the level of the fascia with two S retractors. The fascia was elevated with 2 kocher clamps and incised with a scalpel.  Both sides of the fascia were then tagged with 0-vicryl suture.  The peritoneum was then elevated with 2 vishal clamps and was entered bluntly.  The jaya port was then placed. CO2 was then insufflated into the abdomen.     Once this was accomplished, we then carefully inspected the abdomen and there was no evidence of injury. The patient was placed in deep Trendelenburg. We then placed three 5-mm accessory ports.  All were placed under direct visualization.  The small bowel was manipulated out of the pelvis.     We then toggled back and forth from regular view to firefly mode to perform a bilateral sentinel pelvic lymph node dissection. The uterus was placed on traction. The round ligaments were sealed and divided with the bovie device. The broad ligament was dissected cephalad and caudad. The parametria appeared free of disease bilaterally. Lymph nodes were then visualized bilaterally and harvested off of the bilateral external iliac vessels. Hemostasis was secure. We carefully inspected the areas and there was no evidence of bleeding. The obturator and genitofemoral nerves were  conserved.  The lymph nodes were removed through the laparoscopic port and sent to pathology for permanent section.     We then proceeded with the hysterectomy.   The infundibulopelvic ligaments were then isolated away from the ureters which were well visualized bilaterally.  A window was created between the two and the IP ligaments were cauterized and then divided  with the ligasure device.      The anterior and posterior leaves of the broad ligament were dissected.   At the vesicouterine fold the peritoneum was incised transversely and the bladder sharply dissected off the lower uterine segment and upper vagina.  The posterior peritoneum was gently brought down from the uterus.  The uterine vessels were skeletonized then cauterized and divided with the ligasure device.      We then began the colpotomy. Monopolar bovie was used and the cervix was released circumferentially over the cervical cup.  The uterus, tubes and ovaries and cervix were delivered transvaginally without difficulty.       The cuff was closed laparoscopically with 0 Maxon v-loc suture.  Hemostasis was achieved.  The abdomen was copiously irrigated.  Lawson was then placed in the lymph node beds and along the vaginal cuff.  Hemostasis was noted.     The 5mm ports were all removed under direct visualization.  The 12mm port was removed and the fascia was then closed with running suture of 0-vicryl followed by tying the two previously-tagged end of fascia together. Hemostasis was noted.  Ports were closed using 4-0 vicryl in a subcuticular fashion, followed by steristrips.      The guillen was removed and the balloon was removed from the vagina.     Sponge, needle, instrument counts were correct x 2.    I was present for the entirety of the procedure(s).     Tonya Martinez MD

## 2023-10-16 NOTE — PROCEDURES
Peripheral Block    Patient location during procedure: pre-op  Start time: 10/16/2023 11:49 AM  End time: 10/16/2023 11:56 AM  Reason for block: at surgeon's request  Staffing  Performed: resident   Authorized by: Donna Hernandez DO    Performed by: Donna Hernandez DO  Preanesthetic Checklist  Completed: patient identified, IV checked, site marked, risks and benefits discussed, surgical consent, monitors and equipment checked, pre-op evaluation and timeout performed   Timeout performed at: 10/16/2023 11:48 AM  Peripheral Block  Patient position: laying flat  Prep: ChloraPrep  Patient monitoring: heart rate and continuous pulse ox  Block type: QL  Laterality: B/L  Injection technique: single-shot  Guidance: ultrasound guided  Local infiltration: lidocaine  Needle  Needle type: Tuohy   Needle gauge: 26 G  Needle length: 8 cm  Needle localization: ultrasound guidance     image stored in chart  Assessment  Injection assessment: negative aspiration for heme, no paresthesia on injection, incremental injection and local visualized surrounding nerve on ultrasound  Additional Notes  QL single shot. informed consent obtained. risks and benefits discussed. ASA monitors placed, timeout performed. Pt positioned, prepped with chlorhexidine, draped with sterile towels. Ultrasound guidance used with visualization of the needle throughout duration of the procedure. Aspiration was negative. A total of 30 cc 0.5% ropivacaine, 100mcg epinephrine, and 4mg decadron injected between both sides. Patient tolerated procedure well.     Timeout by JOSE Barnard

## 2023-10-16 NOTE — ANESTHESIA PREPROCEDURE EVALUATION
Patient: Candi Varner    Procedure Information       Anesthesia Start Date/Time: 10/16/23 1209    Procedure: Total laparoscopic hysterectomy; bilateral salpingo oophorectomy; sentinel nodes - roderick/nc 9/25    Location: [facility] OR 03 / Virtual [facility] OR    Surgeons: Tonya Martinez MD            Relevant Problems   Anesthesia (within normal limits)      Cardiovascular (within normal limits)      Endocrine (within normal limits)      GI (within normal limits)      /Renal (within normal limits)      Pulmonary (within normal limits)      GI/Hepatic (within normal limits)      Musculoskeletal (within normal limits)      Eyes, Ears, Nose, and Throat (within normal limits)      Infectious Disease (within normal limits)       Clinical information reviewed:   Tobacco  Allergies  Meds   Med Hx  Surg Hx  OB Status  Fam Hx  Soc   Hx        NPO Detail:  NPO/Void Status  Carbonhydrate Drink Given Prior to Surgery? : N  Date of Last Liquid: 10/16/23  Time of Last Liquid: 0000  Date of Last Solid: 10/16/23  Time of Last Solid: 0000  Last Intake Type: Clear fluids  Time of Last Void: 1000         Physical Exam    Airway  Mallampati: III  TM distance: >3 FB  Neck ROM: full     Cardiovascular - normal exam     Dental    Pulmonary - normal exam     Abdominal            Anesthesia Plan    ASA 2     general     The patient is not a current smoker.    intravenous induction   Postoperative administration of opioids is intended.  Anesthetic plan and risks discussed with patient and spouse.  Use of blood products discussed with patient and spouse who.    Plan discussed with resident and attending.

## 2023-10-16 NOTE — ANESTHESIA PROCEDURE NOTES
Airway  Date/Time: 10/16/2023 12:58 PM  Urgency: elective    Airway not difficult    Staffing  Performed: resident   Authorized by: Grace Calvin MD    Performed by: Vimal Melendez MD  Patient location during procedure: OR    Indications and Patient Condition  Indications for airway management: anesthesia and airway protection  Spontaneous Ventilation: absent  Sedation level: deep  Preoxygenated: yes  Patient position: sniffing  Mask difficulty assessment: 2 - vent by mask + OA or adjuvant +/- NMBA    Final Airway Details  Final airway type: endotracheal airway      Successful airway: ETT  Cuffed: yes   Successful intubation technique: video laryngoscopy  Facilitating devices/methods: intubating stylet  Endotracheal tube insertion site: oral  Blade size: #3  ETT size (mm): 7.0  Cormack-Lehane Classification: grade I - full view of glottis  Placement verified by: chest auscultation and capnometry   Measured from: lips  ETT to lips (cm): 22  Number of attempts at approach: 1

## 2023-10-17 VITALS
TEMPERATURE: 99.5 F | WEIGHT: 192.46 LBS | BODY MASS INDEX: 37.79 KG/M2 | RESPIRATION RATE: 19 BRPM | DIASTOLIC BLOOD PRESSURE: 73 MMHG | SYSTOLIC BLOOD PRESSURE: 124 MMHG | OXYGEN SATURATION: 95 % | HEART RATE: 73 BPM | HEIGHT: 60 IN

## 2023-10-17 PROBLEM — Z90.710 S/P LAPAROSCOPIC HYSTERECTOMY: Status: ACTIVE | Noted: 2023-10-17

## 2023-10-17 LAB
ANION GAP SERPL CALC-SCNC: 14 MMOL/L (ref 10–20)
BUN SERPL-MCNC: 9 MG/DL (ref 6–23)
CALCIUM SERPL-MCNC: 9.2 MG/DL (ref 8.6–10.6)
CHLORIDE SERPL-SCNC: 106 MMOL/L (ref 98–107)
CO2 SERPL-SCNC: 25 MMOL/L (ref 21–32)
CREAT SERPL-MCNC: 0.67 MG/DL (ref 0.5–1.05)
ERYTHROCYTE [DISTWIDTH] IN BLOOD BY AUTOMATED COUNT: 13.5 % (ref 11.5–14.5)
GFR SERPL CREATININE-BSD FRML MDRD: >90 ML/MIN/1.73M*2
GLUCOSE SERPL-MCNC: 158 MG/DL (ref 74–99)
HCT VFR BLD AUTO: 39.2 % (ref 36–46)
HGB BLD-MCNC: 12.9 G/DL (ref 12–16)
MAGNESIUM SERPL-MCNC: 2.18 MG/DL (ref 1.6–2.4)
MCH RBC QN AUTO: 29.2 PG (ref 26–34)
MCHC RBC AUTO-ENTMCNC: 32.9 G/DL (ref 32–36)
MCV RBC AUTO: 89 FL (ref 80–100)
NRBC BLD-RTO: 0 /100 WBCS (ref 0–0)
PLATELET # BLD AUTO: 302 X10*3/UL (ref 150–450)
PMV BLD AUTO: 10.8 FL (ref 7.5–11.5)
POTASSIUM SERPL-SCNC: 4.5 MMOL/L (ref 3.5–5.3)
RBC # BLD AUTO: 4.42 X10*6/UL (ref 4–5.2)
SODIUM SERPL-SCNC: 140 MMOL/L (ref 136–145)
WBC # BLD AUTO: 12.1 X10*3/UL (ref 4.4–11.3)

## 2023-10-17 PROCEDURE — G0378 HOSPITAL OBSERVATION PER HR: HCPCS

## 2023-10-17 PROCEDURE — 96372 THER/PROPH/DIAG INJ SC/IM: CPT

## 2023-10-17 PROCEDURE — 2580000001 HC RX 258 IV SOLUTIONS

## 2023-10-17 PROCEDURE — 2500000001 HC RX 250 WO HCPCS SELF ADMINISTERED DRUGS (ALT 637 FOR MEDICARE OP)

## 2023-10-17 PROCEDURE — 99231 SBSQ HOSP IP/OBS SF/LOW 25: CPT | Performed by: STUDENT IN AN ORGANIZED HEALTH CARE EDUCATION/TRAINING PROGRAM

## 2023-10-17 PROCEDURE — 36415 COLL VENOUS BLD VENIPUNCTURE: CPT | Mod: CMCLAB

## 2023-10-17 PROCEDURE — 99238 HOSP IP/OBS DSCHRG MGMT 30/<: CPT | Performed by: STUDENT IN AN ORGANIZED HEALTH CARE EDUCATION/TRAINING PROGRAM

## 2023-10-17 PROCEDURE — 7100000011 HC EXTENDED STAY RECOVERY HOURLY - NURSING UNIT

## 2023-10-17 PROCEDURE — 80048 BASIC METABOLIC PNL TOTAL CA: CPT | Mod: CMCLAB

## 2023-10-17 PROCEDURE — 85027 COMPLETE CBC AUTOMATED: CPT

## 2023-10-17 PROCEDURE — 83735 ASSAY OF MAGNESIUM: CPT | Mod: CMCLAB

## 2023-10-17 PROCEDURE — 2500000004 HC RX 250 GENERAL PHARMACY W/ HCPCS (ALT 636 FOR OP/ED)

## 2023-10-17 RX ORDER — ACETAMINOPHEN 325 MG/1
975 TABLET ORAL EVERY 6 HOURS
Status: DISCONTINUED | OUTPATIENT
Start: 2023-10-17 | End: 2023-10-17 | Stop reason: HOSPADM

## 2023-10-17 RX ORDER — TRAMADOL HYDROCHLORIDE 50 MG/1
50 TABLET ORAL EVERY 6 HOURS PRN
Status: DISCONTINUED | OUTPATIENT
Start: 2023-10-17 | End: 2023-10-17 | Stop reason: HOSPADM

## 2023-10-17 RX ORDER — AMOXICILLIN 250 MG
2 CAPSULE ORAL 2 TIMES DAILY
Status: DISCONTINUED | OUTPATIENT
Start: 2023-10-17 | End: 2023-10-17 | Stop reason: HOSPADM

## 2023-10-17 RX ORDER — SIMETHICONE 80 MG
80 TABLET,CHEWABLE ORAL 4 TIMES DAILY PRN
Status: DISCONTINUED | OUTPATIENT
Start: 2023-10-17 | End: 2023-10-17 | Stop reason: HOSPADM

## 2023-10-17 RX ORDER — SODIUM CHLORIDE, SODIUM LACTATE, POTASSIUM CHLORIDE, CALCIUM CHLORIDE 600; 310; 30; 20 MG/100ML; MG/100ML; MG/100ML; MG/100ML
40 INJECTION, SOLUTION INTRAVENOUS CONTINUOUS
Status: DISCONTINUED | OUTPATIENT
Start: 2023-10-17 | End: 2023-10-17 | Stop reason: HOSPADM

## 2023-10-17 RX ORDER — HYDROMORPHONE HYDROCHLORIDE 1 MG/ML
0.2 INJECTION, SOLUTION INTRAMUSCULAR; INTRAVENOUS; SUBCUTANEOUS
Status: DISCONTINUED | OUTPATIENT
Start: 2023-10-17 | End: 2023-10-17 | Stop reason: HOSPADM

## 2023-10-17 RX ORDER — TRAMADOL HYDROCHLORIDE 50 MG/1
100 TABLET ORAL EVERY 6 HOURS PRN
Status: DISCONTINUED | OUTPATIENT
Start: 2023-10-17 | End: 2023-10-17 | Stop reason: HOSPADM

## 2023-10-17 RX ORDER — HEPARIN SODIUM 5000 [USP'U]/ML
5000 INJECTION, SOLUTION INTRAVENOUS; SUBCUTANEOUS EVERY 8 HOURS
Status: DISCONTINUED | OUTPATIENT
Start: 2023-10-17 | End: 2023-10-17 | Stop reason: HOSPADM

## 2023-10-17 RX ORDER — NALOXONE HYDROCHLORIDE 0.4 MG/ML
0.1 INJECTION, SOLUTION INTRAMUSCULAR; INTRAVENOUS; SUBCUTANEOUS EVERY 5 MIN PRN
Status: DISCONTINUED | OUTPATIENT
Start: 2023-10-17 | End: 2023-10-17 | Stop reason: HOSPADM

## 2023-10-17 RX ORDER — ONDANSETRON HYDROCHLORIDE 2 MG/ML
4 INJECTION, SOLUTION INTRAVENOUS EVERY 6 HOURS PRN
Status: DISCONTINUED | OUTPATIENT
Start: 2023-10-17 | End: 2023-10-17 | Stop reason: HOSPADM

## 2023-10-17 RX ORDER — POLYETHYLENE GLYCOL 3350 17 G/17G
17 POWDER, FOR SOLUTION ORAL DAILY PRN
Status: DISCONTINUED | OUTPATIENT
Start: 2023-10-17 | End: 2023-10-17 | Stop reason: HOSPADM

## 2023-10-17 RX ADMIN — SENNOSIDES AND DOCUSATE SODIUM 2 TABLET: 50; 8.6 TABLET ORAL at 08:08

## 2023-10-17 RX ADMIN — HEPARIN SODIUM 5000 UNITS: 5000 INJECTION INTRAVENOUS; SUBCUTANEOUS at 08:09

## 2023-10-17 RX ADMIN — ACETAMINOPHEN 975 MG: 325 TABLET ORAL at 00:50

## 2023-10-17 RX ADMIN — TRAMADOL HYDROCHLORIDE 100 MG: 50 TABLET, COATED ORAL at 12:48

## 2023-10-17 RX ADMIN — ACETAMINOPHEN 975 MG: 325 TABLET ORAL at 06:07

## 2023-10-17 RX ADMIN — HEPARIN SODIUM 5000 UNITS: 5000 INJECTION INTRAVENOUS; SUBCUTANEOUS at 00:50

## 2023-10-17 RX ADMIN — TRAMADOL HYDROCHLORIDE 50 MG: 50 TABLET, COATED ORAL at 00:40

## 2023-10-17 RX ADMIN — SODIUM CHLORIDE, POTASSIUM CHLORIDE, SODIUM LACTATE AND CALCIUM CHLORIDE 40 ML/HR: 600; 310; 30; 20 INJECTION, SOLUTION INTRAVENOUS at 00:43

## 2023-10-17 RX ADMIN — TRAMADOL HYDROCHLORIDE 50 MG: 50 TABLET, COATED ORAL at 06:48

## 2023-10-17 RX ADMIN — ACETAMINOPHEN 975 MG: 325 TABLET ORAL at 12:48

## 2023-10-17 ASSESSMENT — COGNITIVE AND FUNCTIONAL STATUS - GENERAL
STANDING UP FROM CHAIR USING ARMS: A LITTLE
MOVING FROM LYING ON BACK TO SITTING ON SIDE OF FLAT BED WITH BEDRAILS: A LITTLE
MOVING TO AND FROM BED TO CHAIR: A LITTLE
MOBILITY SCORE: 24
TURNING FROM BACK TO SIDE WHILE IN FLAT BAD: A LITTLE
PATIENT BASELINE BEDBOUND: NO
DAILY ACTIVITIY SCORE: 24
WALKING IN HOSPITAL ROOM: A LITTLE
MOBILITY SCORE: 18
CLIMB 3 TO 5 STEPS WITH RAILING: A LITTLE

## 2023-10-17 ASSESSMENT — PAIN SCALES - GENERAL
PAINLEVEL_OUTOF10: 7
PAINLEVEL_OUTOF10: 8
PAINLEVEL_OUTOF10: 7

## 2023-10-17 NOTE — NURSING NOTE
IV catheter removed, discharge teaching given, patient demonstrates understanding & expresses readiness to be discharged. Patient left with private vehicle (spouse picked her up).

## 2023-10-17 NOTE — DISCHARGE SUMMARY
Discharge Diagnosis  Status post hysterectomy    Test Results Pending At Discharge  Pending Labs       Order Current Status    Surgical Pathology Exam In process            Hospital Course  56yo presenting for TLH-BSO and sLND for endometrial adenocarcinoma (endometrioid type FIGO 1, MMR proficient and p53 wild type).    Patient was admitted on 10/16 for scheduled surgery. She underwent an TLH-BSO and sLND. Her procedure was uncomplicated, see the operative report for full details. She was admitted for obs overnight d/t desats ini PACU. By POD1 she was meeting all postoperative milestones including: tolerating PO diet and medications, voiding, ambulating. Her pain was well controlled with PO pain medications. She was appropriate for discharge home and will follow up as an outpatient in 2 weeks with Dr. Martinez.    ____________________________________  From H&P:  56yo presenting for TLH-BSO and sLND for endometrial adenocarcinoma (endometrioid type FIGO 1, MMR proficient and p53 wild type).      Tumor Hx:   8/25/23: Underwent endometrial biopsy 2/2 an episode of AUB post menopause.   Pathology indicated Endometrial (endometrioid type) FIGO 1, MMR proficient and wild type p53.     US 7/31/23:  IMPRESSION:  1.  Thickened endometrium noted measuring up to 10 mm, could be  related to endometrial hyperplasia, other etiologies cannot be  excluded, recommend gyn consultation.    PMH: Sciatica       Past Surgical History: Cervical Disc replacement, Transvaginal mesh       Family History: Father diagnosed with unknown Colon cancer @ 84 , Mother with   hyst for unknown gynecological lesion, Brother w/ unknown cervical cancer.   Otherwise denies a history of gyn related cancers including ovarian,   endometrial, breast, pancreas, and GI cancers.       Social History: Denies a history of smoking, alcohol use or recreational drug use.  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  Entered menopause at 55 year old.  She   has used OCP for 10-13 years.  She has  never used HRT.       Allergies: NKDA  Meds: none    Pertinent Physical Exam At Time of Discharge  Physical Exam  Vitals reviewed. Exam conducted with a chaperone present.   Constitutional:       General: She is not in acute distress.     Appearance: Normal appearance. She is not ill-appearing or toxic-appearing.   HENT:      Head: Normocephalic and atraumatic.      Nose: Nose normal.      Mouth/Throat:      Mouth: Mucous membranes are moist.   Eyes:      Extraocular Movements: Extraocular movements intact.      Conjunctiva/sclera: Conjunctivae normal.      Pupils: Pupils are equal, round, and reactive to light.   Cardiovascular:      Rate and Rhythm: Normal rate.   Pulmonary:      Effort: Pulmonary effort is normal. No respiratory distress.      Breath sounds: Normal breath sounds.   Abdominal:      Palpations: Abdomen is soft.      Tenderness: There is no abdominal tenderness.      Comments: 4 laparoscopic incisions c/d/i   Musculoskeletal:         General: Normal range of motion.      Cervical back: Normal range of motion.   Skin:     General: Skin is warm and dry.   Neurological:      General: No focal deficit present.      Mental Status: She is alert and oriented to person, place, and time.   Psychiatric:         Mood and Affect: Mood normal.         Behavior: Behavior normal.         Thought Content: Thought content normal.         Judgment: Judgment normal.         Home Medications     Medication List      START taking these medications     acetaminophen 325 mg tablet; Commonly known as: Tylenol; Take 2 tablets   (650 mg) by mouth every 6 hours if needed for mild pain (1 - 3) for up to   20 doses.   ibuprofen 600 mg tablet; Take 1 tablet (600 mg) by mouth every 6 hours   if needed for moderate pain (4 - 6) for up to 20 doses.   traMADol 50 mg tablet; Commonly known as: Ultram; Take 1 tablet (50 mg)   by mouth  every 6 hours if needed for severe pain (7 - 10) for up to 12   doses.       Outpatient Follow-Up  Future Appointments   Date Time Provider Department Center   11/2/2023  8:40 AM Tonya Martinez MD [id number] [facility]   11/7/2023 10:45 AM Jeison Arauz MD [facility]   12/6/2023  8:30 AM Pretty Cloud PA-C [id number] [facility]   1/18/2024  2:30 PM Shanell Hood DO [id number]       Laly Centeno MD

## 2023-10-17 NOTE — PROGRESS NOTES
"Candi Varner is a 57 y.o. female on day 0 of admission Status post hysterectomy.    Subjective   Pt feeling well. States pain is 2-3/10. Denies SOB, CP. Voided. Ambulated. Has tried ginger ale but no solid food yet.       Objective     Physical Exam  Vitals reviewed.   Constitutional:       Appearance: Normal appearance.   HENT:      Head: Normocephalic and atraumatic.   Cardiovascular:      Rate and Rhythm: Normal rate and regular rhythm.   Pulmonary:      Effort: Pulmonary effort is normal. No respiratory distress.      Breath sounds: Normal breath sounds.   Abdominal:      General: Abdomen is flat. There is no distension.      Palpations: Abdomen is soft.      Tenderness: There is no abdominal tenderness. There is no guarding or rebound.      Comments: Incisions with bandaids in place, appear c/d/i   Musculoskeletal:      Cervical back: Normal range of motion.   Skin:     General: Skin is warm and dry.   Neurological:      Mental Status: She is alert and oriented to person, place, and time.   Psychiatric:         Mood and Affect: Mood normal.         Last Recorded Vitals  Blood pressure 123/58, pulse 83, temperature 36.1 °C (97 °F), resp. rate 14, height 1.525 m (5' 0.05\"), weight 87.3 kg (192 lb 7.4 oz), SpO2 96 %.  Intake/Output last 3 Shifts:  I/O last 3 completed shifts:  In: 2400 (27.5 mL/kg) [I.V.:2400 (27.5 mL/kg)]  Out: 625 (7.2 mL/kg) [Urine:575 (0.2 mL/kg/hr); Blood:50]  Weight: 87.3 kg     Relevant Results  Results for orders placed or performed in visit on 10/13/23 (from the past 96 hour(s))   Comprehensive Metabolic Panel   Result Value Ref Range    Glucose 89 74 - 99 mg/dL    Sodium 140 136 - 145 mmol/L    Potassium 4.8 3.5 - 5.3 mmol/L    Chloride 104 98 - 107 mmol/L    Bicarbonate 27 21 - 32 mmol/L    Anion Gap 14 10 - 20 mmol/L    Urea Nitrogen 8 6 - 23 mg/dL    Creatinine 0.93 0.50 - 1.05 mg/dL    eGFR 71 >60 mL/min/1.73m*2    Calcium 9.5 8.6 - 10.3 mg/dL    Albumin 4.1 3.4 - 5.0 g/dL    " Alkaline Phosphatase 52 33 - 110 U/L    Total Protein 7.1 6.4 - 8.2 g/dL    AST 26 9 - 39 U/L    Bilirubin, Total 0.6 0.0 - 1.2 mg/dL    ALT 28 7 - 45 U/L   CBC and Auto Differential   Result Value Ref Range    WBC 7.1 4.4 - 11.3 x10*3/uL    nRBC 0.0 0.0 - 0.0 /100 WBCs    RBC 4.91 4.00 - 5.20 x10*6/uL    Hemoglobin 14.5 12.0 - 16.0 g/dL    Hematocrit 43.6 36.0 - 46.0 %    MCV 89 80 - 100 fL    MCH 29.5 26.0 - 34.0 pg    MCHC 33.3 32.0 - 36.0 g/dL    RDW 13.6 11.5 - 14.5 %    Platelets 321 150 - 450 x10*3/uL    MPV 10.4 7.5 - 11.5 fL    Neutrophils % 54.2 40.0 - 80.0 %    Immature Granulocytes %, Automated 0.4 0.0 - 0.9 %    Lymphocytes % 30.3 13.0 - 44.0 %    Monocytes % 9.3 2.0 - 10.0 %    Eosinophils % 4.7 0.0 - 6.0 %    Basophils % 1.1 0.0 - 2.0 %    Neutrophils Absolute 3.82 1.20 - 7.70 x10*3/uL    Immature Granulocytes Absolute, Automated 0.03 0.00 - 0.70 x10*3/uL    Lymphocytes Absolute 2.14 1.20 - 4.80 x10*3/uL    Monocytes Absolute 0.66 0.10 - 1.00 x10*3/uL    Eosinophils Absolute 0.33 0.00 - 0.70 x10*3/uL    Basophils Absolute 0.08 0.00 - 0.10 x10*3/uL   Type And Screen   Result Value Ref Range    ABO TYPE B     Rh TYPE POS     ANTIBODY SCREEN NEG               Assessment/Plan   Principal Problem:    Status post hysterectomy  Active Problems:    Post-op pain    56yo F s/p total laparoscopic hysterectomy, bilateral salpingooophorectomy, sentinel lymph node dissection for endometrial adenocarcinoma.    Postoperative  -Desaturated to 81 while in PACU after surgery. Desaturation occurred when sleeping, while awake pt saturating well on RA. Asymptomatic. Lungs clear. No known h/o BREA. In early postoperative course, differential includes atelectasis, bronchospasm, undiagnosed BREA. Will admit for obs and continue to monitor. If ongoing oxygen requirement, can consider CXR/further workup. IS ordered. COVID test pending.  -Voiding s/p guillen   -Reg diet. Antiemetics and bowel regimen ordered.  -Abdominal exam  wnl  -LR @ 40    DVT ppx  -Heparin, SCDs    Medical comorbidities  -Only comorbid condition is sciatica    To be discussed with team in AM  Magaly Robin MD PGY-2  GYN ONC, pager [phone]

## 2023-10-17 NOTE — CARE PLAN
Problem: Fall/Injury  Goal: Not fall by end of shift  Outcome: Progressing  Goal: Be free from injury by end of the shift  Outcome: Progressing  Goal: Verbalize understanding of personal risk factors for fall in the hospital  Outcome: Progressing  Goal: Verbalize understanding of risk factor reduction measures to prevent injury from fall in the home  Outcome: Progressing  Goal: Use assistive devices by end of the shift  Outcome: Progressing  Goal: Pace activities to prevent fatigue by end of the shift  Outcome: Progressing   The patient's goals for the shift include

## 2023-10-17 NOTE — PROGRESS NOTES
Acute Pain Service    Candi Varner is a 57 y.o. year old female patient presenting for total laparoscopic hysterectomy BSO with Dr. Martinez 10/16/23.     Postop Pain HPI -   Palliative: relieved with IV analgesics and regional local anesthetics  Provocative: movement  Quality:  burning and aching  Radiation:  none  Severity:  4-6/10  Timing: constant    24-HOUR OPIOID CONSUMPTION:  Dilaudid 0.2mg  Tramadol 50mg    Scheduled medications  acetaminophen, 975 mg, oral, q6h  heparin (porcine), 5,000 Units, subcutaneous, q8h  sennosides-docusate sodium, 2 tablet, oral, BID      Continuous medications  lactated Ringer's, 40 mL/hr, Last Rate: 40 mL/hr (10/17/23 0043)      PRN medications  PRN medications: HYDROmorphone, naloxone, ondansetron, polyethylene glycol, simethicone, traMADol, traMADol     Physical Exam:  Constitutional:  no distress, alert and cooperative  Eyes: clear sclera  Head/Neck: No apparent injury, trachea midline  Respiratory/Thorax: Patent airways, thorax symmetric, breathing comfortably  Cardiovascular: no pitting edema  Gastrointestinal: Nondistended  Musculoskeletal: ROM intact  Extremities: no clubbing  Neurological: alert, jo x4  Psychological: Appropriate affect    Results for orders placed or performed during the hospital encounter of 10/16/23 (from the past 24 hour(s))   Sars-CoV-2 PCR, Symptomatic   Result Value Ref Range    Coronavirus 2019, PCR Not Detected Not Detected       Candi Varner is a 57 y.o. year old female patient presenting for total laparoscopic hysterectomy BSO with Dr. Martinez 10/16/23. Received bilateral quadratus lumborum single shot nerve blocks preoperatively.    PLAN  - Single shot nerve blocks now worn off  - Encourage PO medications for additional pain control  - Acute pain service will sign off     Acute Pain Service Resident  pg [phone] ph [phone]

## 2023-10-23 DIAGNOSIS — C54.1 ENDOMETRIAL ADENOCARCINOMA (MULTI): Primary | ICD-10-CM

## 2023-10-24 LAB
LABORATORY COMMENT REPORT: NORMAL
PATH REPORT.FINAL DX SPEC: NORMAL
PATH REPORT.GROSS SPEC: NORMAL
PATH REPORT.RELEVANT HX SPEC: NORMAL
PATH REPORT.TOTAL CANCER: NORMAL
PATHOLOGY SYNOPTIC REPORT: NORMAL

## 2023-10-24 NOTE — TUMOR BOARD NOTE
Gynecologic Oncology Tumor Board 10/27/2023    Specialties Present: Gyn Onc, Radiology, Genetics, Radiation oncology, Pathology, Nurse Navigator, Research    Candi Gardenia  57 y.o.    1965  MRN [id number]    Provider: Tonya Martinez MD  Disease Site: Uterine  Pathology: Endometrial adenocarcinoma, endometrioid type; MMRp, p53WT, negative LVI; 18% myometrial invasion; negative lymph nodes  Grade: FIGO grade 1  Stage: IA    We reviewed previous medical and familial history, history of present illness, and recent lab results along with all available histopathologic and imaging studies. The tumor board considered available treatment options and made the following recommendations.    Recommendations:  Recommended Plan: Observation, surveillance  Clinical Trial Eligibility: None available  Referrals Needed: Cancer Recovery and Survivorship (CaReS)    National site-specific guidelines were discussed with respect to the case. It is recognized that there may be additional factors not discussed in the Review Board discussion that can influence management decisions, and alternative management options which fall within the standard of care. Accordingly the final treatment disposition will be determined by the patient, in the context of an informed discussion with their providers. The actual prescribed management or treatment plan may or may not be consistent with the Review Board recommendations.

## 2023-10-26 ENCOUNTER — APPOINTMENT (OUTPATIENT)
Dept: PAIN MEDICINE | Facility: CLINIC | Age: 58
End: 2023-10-26
Payer: COMMERCIAL

## 2023-10-27 ENCOUNTER — TUMOR BOARD CONFERENCE (OUTPATIENT)
Dept: HEMATOLOGY/ONCOLOGY | Facility: HOSPITAL | Age: 58
End: 2023-10-27
Payer: COMMERCIAL

## 2023-10-30 PROBLEM — M99.01 CERVICOTHORACIC SOMATIC DYSFUNCTION: Status: ACTIVE | Noted: 2023-10-30

## 2023-10-30 PROBLEM — M54.30 SCIATICA: Status: ACTIVE | Noted: 2023-10-30

## 2023-10-30 PROBLEM — K21.9 GERD (GASTROESOPHAGEAL REFLUX DISEASE): Status: ACTIVE | Noted: 2023-10-30

## 2023-10-30 PROBLEM — E66.9 OBESITY WITH BODY MASS INDEX (BMI) OF 35.0 TO 39.9 WITHOUT COMORBIDITY: Status: ACTIVE | Noted: 2023-10-30

## 2023-10-30 PROBLEM — M99.03 LUMBOSACRAL DYSFUNCTION: Status: ACTIVE | Noted: 2023-10-30

## 2023-10-30 PROBLEM — K64.8 INTERNAL HEMORRHOIDS: Status: ACTIVE | Noted: 2023-10-30

## 2023-10-30 PROBLEM — M54.16 SPINAL STENOSIS OF LUMBAR REGION WITH RADICULOPATHY: Status: ACTIVE | Noted: 2023-10-30

## 2023-10-30 PROBLEM — R13.10 DYSPHAGIA: Status: ACTIVE | Noted: 2023-10-30

## 2023-10-30 PROBLEM — R73.9 ELEVATED BLOOD SUGAR: Status: ACTIVE | Noted: 2023-10-30

## 2023-10-30 PROBLEM — M81.0 OSTEOPOROSIS: Status: ACTIVE | Noted: 2023-10-30

## 2023-10-30 PROBLEM — M47.22 CERVICAL SPONDYLOSIS WITH RADICULOPATHY: Status: ACTIVE | Noted: 2023-10-30

## 2023-10-30 PROBLEM — C54.1 ENDOMETRIAL CANCER DETERMINED BY UTERINE BIOPSY (MULTI): Status: ACTIVE | Noted: 2023-10-30

## 2023-10-30 PROBLEM — R39.15 URINARY URGENCY: Status: ACTIVE | Noted: 2023-10-30

## 2023-10-30 PROBLEM — N20.0 NEPHROLITHIASIS: Status: ACTIVE | Noted: 2023-10-30

## 2023-10-30 PROBLEM — R11.10 GASTRIC REGURGITATION: Status: ACTIVE | Noted: 2023-10-30

## 2023-10-30 PROBLEM — M48.061 SPINAL STENOSIS OF LUMBAR REGION WITH RADICULOPATHY: Status: ACTIVE | Noted: 2023-10-30

## 2023-10-30 PROBLEM — R63.5 ABNORMAL WEIGHT GAIN: Status: ACTIVE | Noted: 2023-10-30

## 2023-10-30 PROBLEM — M54.12 CERVICAL RADICULOPATHY, ACUTE: Status: ACTIVE | Noted: 2023-10-30

## 2023-10-30 PROBLEM — G47.00 INSOMNIA: Status: ACTIVE | Noted: 2023-10-30

## 2023-10-30 PROBLEM — R73.03 PREDIABETES: Status: ACTIVE | Noted: 2023-10-30

## 2023-10-30 PROBLEM — N93.9 ABNORMAL UTERINE BLEEDING: Status: ACTIVE | Noted: 2023-10-30

## 2023-10-30 PROBLEM — Z98.1 ARTHRODESIS STATUS: Status: ACTIVE | Noted: 2023-10-30

## 2023-10-30 PROBLEM — M99.09 SEGMENTAL AND SOMATIC DYSFUNCTION: Status: ACTIVE | Noted: 2023-10-30

## 2023-10-30 RX ORDER — TIZANIDINE 2 MG/1
1-2 TABLET ORAL 3 TIMES DAILY PRN
COMMUNITY
Start: 2021-04-07 | End: 2024-02-08 | Stop reason: WASHOUT

## 2023-10-30 RX ORDER — OXYCODONE AND ACETAMINOPHEN 2.5; 325 MG/1; MG/1
1 TABLET ORAL EVERY 6 HOURS PRN
COMMUNITY
End: 2024-02-08 | Stop reason: WASHOUT

## 2023-10-30 RX ORDER — ERGOCALCIFEROL 1.25 MG/1
1 CAPSULE ORAL
COMMUNITY
Start: 2023-01-27 | End: 2024-02-08 | Stop reason: ALTCHOICE

## 2023-10-30 RX ORDER — OMEPRAZOLE 20 MG/1
1 CAPSULE, DELAYED RELEASE ORAL
COMMUNITY
Start: 2023-09-25

## 2023-10-30 RX ORDER — NAPROXEN 500 MG/1
500 TABLET ORAL
COMMUNITY
End: 2024-02-08 | Stop reason: WASHOUT

## 2023-10-30 RX ORDER — GABAPENTIN 300 MG/1
300 CAPSULE ORAL 3 TIMES DAILY
COMMUNITY
Start: 2018-07-19 | End: 2024-02-08 | Stop reason: WASHOUT

## 2023-10-30 RX ORDER — HYDROCODONE BITARTRATE AND ACETAMINOPHEN 5; 325 MG/1; MG/1
1 TABLET ORAL EVERY 8 HOURS PRN
COMMUNITY
Start: 2018-06-28 | End: 2024-02-08 | Stop reason: WASHOUT

## 2023-11-02 ENCOUNTER — OFFICE VISIT (OUTPATIENT)
Dept: GYNECOLOGIC ONCOLOGY | Facility: CLINIC | Age: 58
End: 2023-11-02
Payer: COMMERCIAL

## 2023-11-02 VITALS
HEART RATE: 66 BPM | WEIGHT: 186.07 LBS | OXYGEN SATURATION: 98 % | RESPIRATION RATE: 18 BRPM | SYSTOLIC BLOOD PRESSURE: 125 MMHG | TEMPERATURE: 97.5 F | BODY MASS INDEX: 36.28 KG/M2 | DIASTOLIC BLOOD PRESSURE: 81 MMHG

## 2023-11-02 DIAGNOSIS — C54.1 ENDOMETRIAL CANCER DETERMINED BY UTERINE BIOPSY (MULTI): Primary | ICD-10-CM

## 2023-11-02 DIAGNOSIS — Z71.9 ENCOUNTER FOR COUNSELING: ICD-10-CM

## 2023-11-02 PROCEDURE — 1036F TOBACCO NON-USER: CPT | Performed by: STUDENT IN AN ORGANIZED HEALTH CARE EDUCATION/TRAINING PROGRAM

## 2023-11-02 PROCEDURE — 99024 POSTOP FOLLOW-UP VISIT: CPT | Performed by: STUDENT IN AN ORGANIZED HEALTH CARE EDUCATION/TRAINING PROGRAM

## 2023-11-02 ASSESSMENT — ENCOUNTER SYMPTOMS
OCCASIONAL FEELINGS OF UNSTEADINESS: 0
LOSS OF SENSATION IN FEET: 0
DEPRESSION: 0

## 2023-11-02 ASSESSMENT — PATIENT HEALTH QUESTIONNAIRE - PHQ9
2. FEELING DOWN, DEPRESSED OR HOPELESS: SEVERAL DAYS
1. LITTLE INTEREST OR PLEASURE IN DOING THINGS: NOT AT ALL
10. IF YOU CHECKED OFF ANY PROBLEMS, HOW DIFFICULT HAVE THESE PROBLEMS MADE IT FOR YOU TO DO YOUR WORK, TAKE CARE OF THINGS AT HOME, OR GET ALONG WITH OTHER PEOPLE: NOT DIFFICULT AT ALL
SUM OF ALL RESPONSES TO PHQ9 QUESTIONS 1 AND 2: 1

## 2023-11-02 ASSESSMENT — COLUMBIA-SUICIDE SEVERITY RATING SCALE - C-SSRS
2. HAVE YOU ACTUALLY HAD ANY THOUGHTS OF KILLING YOURSELF?: NO
6. HAVE YOU EVER DONE ANYTHING, STARTED TO DO ANYTHING, OR PREPARED TO DO ANYTHING TO END YOUR LIFE?: NO
1. IN THE PAST MONTH, HAVE YOU WISHED YOU WERE DEAD OR WISHED YOU COULD GO TO SLEEP AND NOT WAKE UP?: NO

## 2023-11-02 ASSESSMENT — PAIN SCALES - GENERAL: PAINLEVEL: 0-NO PAIN

## 2023-11-02 NOTE — PROGRESS NOTES
Patient ID: Candi Varner is a 57 y.o. female.  Referring Physician: No referring provider defined for this encounter.  Primary Care Provider: Shanell Hood, DO    Subjective    Interval History:    She is recovering well from surgery, notes some depression side effects from opioids improving after ceasing them, denies constipation, bowel movements, voiding and appetite are normal, experiencing some vaginal spotting and denies lower extremity edema. Inquired about post operative restrictions and her elevated blood sugar levels at surgery, denies diabetes concerns. Notes intermittent rectal pain, denies hematochezia.        PMH: Sciatica       Past Surgical History: Cervical Disc replacement, Transvaginal mesh       Family History: Father diagnosed with unknown Colon cancer @ 84 , Mother with   hyst for unknown gynecological lesion, Brother w/ unknown cervical cancer.   Otherwise denies a history of gyn related cancers including ovarian,   endometrial, breast, pancreas, and GI cancers.       Social History: Denies a history of smoking, alcohol use or recreational drug use.  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  Entered menopause at 55 year old.  She   has used OCP for 10-13 years.  She has  never used HRT.       Allergies: NKDA     Meds: none     Social: denies t/e/i     Review of Systems   All other systems reviewed and are negative.    Objective    BSA: 1.89 meters squared  /81   Pulse 66   Temp 36.4 °C (97.5 °F)   Resp 18   Wt 84.4 kg (186 lb 1.1 oz)   SpO2 98%   BMI 36.28 kg/m²      Physical Exam  Constitutional:       General: She is not in acute distress.     Appearance: Normal appearance. She is not toxic-appearing.   HENT:      Head: Normocephalic.      Mouth/Throat:      Mouth: Mucous membranes are moist.      Pharynx: Oropharynx is clear.   Eyes:      Extraocular Movements: Extraocular movements intact.       Conjunctiva/sclera: Conjunctivae normal.      Pupils: Pupils are equal, round, and reactive to light.   Cardiovascular:      Rate and Rhythm: Normal rate and regular rhythm.      Heart sounds: Normal heart sounds. No murmur heard.     No friction rub. No gallop.   Pulmonary:      Effort: Pulmonary effort is normal.      Breath sounds: Normal breath sounds. No wheezing or rhonchi.   Abdominal:      General: Bowel sounds are normal. There is no distension.      Palpations: Abdomen is soft.      Tenderness: There is no abdominal tenderness.      Comments: Well healing laparoscopic incision sites    Musculoskeletal:         General: Normal range of motion.      Cervical back: Normal range of motion.   Skin:     General: Skin is warm.   Neurological:      General: No focal deficit present.      Mental Status: She is alert and oriented to person, place, and time.   Psychiatric:         Mood and Affect: Mood normal.         Behavior: Behavior normal.       Admission on 10/16/2023, Discharged on 10/17/2023   Component Date Value Ref Range Status    Case Report 10/16/2023    Final                    Value:Surgical Pathology                                Case: [id number]                                  Authorizing Provider:  Tonya Martinez MD     Collected:           10/16/2023 1359              Ordering Location:     [facility]       Received:            10/16/2023 3463                                     [facility] OR                                                               Pathologist:           Claudia Hull MD                                                                           Specimens:   A) - SENTINEL LYMPH NODE, PELVIC, RIGHT PELVIC SENTINEL LYMPH NODE                                  B) - SENTINEL LYMPH NODE, PELVIC, LEFT PELVIC SENTINEL LYMPH NODE                                   C) - UTERUS, CERVIX,  FALLOPIAN TUBES AND OVARIES BILATERAL, UTERUS, CERVIX, BILATERAL               TUBES AND OVARIES                                                                          FINAL DIAGNOSIS 10/16/2023    Final                    Value:This result contains rich text formatting which cannot be displayed here.      10/16/2023    Final                    Value:This result contains rich text formatting which cannot be displayed here.    Case Summary Report 10/16/2023    Final                    Value:ENDOMETRIUM                            ENDOMETRIUM - All Specimens                            8th Edition - Protocol posted: 12/14/2022                                                        SPECIMEN                               Procedure:    Total hysterectomy and bilateral salpingo-oophorectomy                                Procedure:    Left and right sentinel lymph node biopsies                                Specimen Integrity:    Intact                                                         TUMOR                               Tumor Site:    Endometrium                                Tumor Size:    Greatest Dimension (Centimeters): 2.8 cm                               Histologic Type:    Endometrioid carcinoma, NOS                                Histologic Grade:    FIGO grade 1                                Two-Tier Grading System:    Low grade (encompassing FIGO 1 and 2)                                Myometrial Invasion:    Present                                  Depth of Myometrial Invasion:    2 mm                                 Myometrial Thickness:    11 mm                                 Percentage of Myometrial Invasion:    18 %                               Adenomyosis:    Not identified                                Uterine Serosa Involvement:    Not identified                                Lower Uterine Segment Involvement:    Not identified                                Cervical Stromal Involvement:     Not identified                                Other Tissue / Organ Involvement:    Not identified                                Peritoneal / Ascitic Fluid:    Not submitted / unknown                                Lymphatic and / or Vascular Invasion:    Not identified                                                         REGIONAL LYMPH NODES                               Regional Lymph Node Status:                                     :    All regional lymph nodes negative for tumor cells                                    Lymph Nodes Examined:                                         Total Number of Pelvic Nodes Examined:    1                                      Number of Pelvic [place] Nodes Examined:    1                                      Total Number of Para-aortic Nodes Examined:    0                                                         pTNM CLASSIFICATION (AJCC 8th Edition)                               Reporting of pT, pN, and (when applicable) pM categories is based on information available to the pathologist at the time the report is issued. As per the AJCC (Chapter 1, 8th Ed.) it is the managing physician’s responsibility to establish the final pathologic stage based upon all pertinent information, including but potentially not limited to this pathology report.                               pT Category:    pT1a                                pN Category:    pN0                                N Suffix:    (sn)                                                            Comment(s):    Prior pathology specimen: [id number]; p53 wild-type; Normal mismatch repair protein expression       Clinical History 10/16/2023    Final                    Value:This result contains rich text formatting which cannot be displayed here.    Gross Description 10/16/2023    Final                    Value:This result contains rich text formatting which cannot be displayed here.    WBC 10/17/2023 12.1 (H)  4.4 - 11.3  x10*3/uL Final    nRBC 10/17/2023 0.0  0.0 - 0.0 /100 WBCs Final    RBC 10/17/2023 4.42  4.00 - 5.20 x10*6/uL Final    Hemoglobin 10/17/2023 12.9  12.0 - 16.0 g/dL Final    Hematocrit 10/17/2023 39.2  36.0 - 46.0 % Final    MCV 10/17/2023 89  80 - 100 fL Final    MCH 10/17/2023 29.2  26.0 - 34.0 pg Final    MCHC 10/17/2023 32.9  32.0 - 36.0 g/dL Final    RDW 10/17/2023 13.5  11.5 - 14.5 % Final    Platelets 10/17/2023 302  150 - 450 x10*3/uL Final    MPV 10/17/2023 10.8  7.5 - 11.5 fL Final    Glucose 10/17/2023 158 (H)  74 - 99 mg/dL Final    Sodium 10/17/2023 140  136 - 145 mmol/L Final    Potassium 10/17/2023 4.5  3.5 - 5.3 mmol/L Final    Chloride 10/17/2023 106  98 - 107 mmol/L Final    Bicarbonate 10/17/2023 25  21 - 32 mmol/L Final    Anion Gap 10/17/2023 14  10 - 20 mmol/L Final    Urea Nitrogen 10/17/2023 9  6 - 23 mg/dL Final    Creatinine 10/17/2023 0.67  0.50 - 1.05 mg/dL Final    eGFR 10/17/2023 >90  >60 mL/min/1.73m*2 Final    Calculations of estimated GFR are performed using the 2021 CKD-EPI Study Refit equation without the race variable for the IDMS-Traceable creatinine methods.  https://jasn.asnjournals.org/content/early/2021/09/22/ASN.0689388696    Calcium 10/17/2023 9.2  8.6 - 10.6 mg/dL Final    Coronavirus 2019, PCR 10/16/2023 Not Detected  Not Detected Final    Magnesium 10/17/2023 2.18  1.60 - 2.40 mg/dL Final   Lab on 10/13/2023   Component Date Value Ref Range Status    Glucose 10/13/2023 89  74 - 99 mg/dL Final    Sodium 10/13/2023 140  136 - 145 mmol/L Final    Potassium 10/13/2023 4.8  3.5 - 5.3 mmol/L Final    Chloride 10/13/2023 104  98 - 107 mmol/L Final    Bicarbonate 10/13/2023 27  21 - 32 mmol/L Final    Anion Gap 10/13/2023 14  10 - 20 mmol/L Final    Urea Nitrogen 10/13/2023 8  6 - 23 mg/dL Final    Creatinine 10/13/2023 0.93  0.50 - 1.05 mg/dL Final    eGFR 10/13/2023 71  >60 mL/min/1.73m*2 Final    Calculations of estimated GFR are performed using the 2021 CKD-EPI Study Refit  equation without the race variable for the IDMS-Traceable creatinine methods.  https://jasn.asnjournals.org/content/early/2021/09/22/ASN.2711061695    Calcium 10/13/2023 9.5  8.6 - 10.3 mg/dL Final    Albumin 10/13/2023 4.1  3.4 - 5.0 g/dL Final    Alkaline Phosphatase 10/13/2023 52  33 - 110 U/L Final    Total Protein 10/13/2023 7.1  6.4 - 8.2 g/dL Final    AST 10/13/2023 26  9 - 39 U/L Final    Bilirubin, Total 10/13/2023 0.6  0.0 - 1.2 mg/dL Final    ALT 10/13/2023 28  7 - 45 U/L Final    Patients treated with Sulfasalazine may generate falsely decreased results for ALT.    WBC 10/13/2023 7.1  4.4 - 11.3 x10*3/uL Final    nRBC 10/13/2023 0.0  0.0 - 0.0 /100 WBCs Final    RBC 10/13/2023 4.91  4.00 - 5.20 x10*6/uL Final    Hemoglobin 10/13/2023 14.5  12.0 - 16.0 g/dL Final    Hematocrit 10/13/2023 43.6  36.0 - 46.0 % Final    MCV 10/13/2023 89  80 - 100 fL Final    MCH 10/13/2023 29.5  26.0 - 34.0 pg Final    MCHC 10/13/2023 33.3  32.0 - 36.0 g/dL Final    RDW 10/13/2023 13.6  11.5 - 14.5 % Final    Platelets 10/13/2023 321  150 - 450 x10*3/uL Final    MPV 10/13/2023 10.4  7.5 - 11.5 fL Final    Neutrophils % 10/13/2023 54.2  40.0 - 80.0 % Final    Immature Granulocytes %, Automated 10/13/2023 0.4  0.0 - 0.9 % Final    Immature Granulocyte Count (IG) includes promyelocytes, myelocytes and metamyelocytes but does not include bands. Percent differential counts (%) should be interpreted in the context of the absolute cell counts (cells/UL).    Lymphocytes % 10/13/2023 30.3  13.0 - 44.0 % Final    Monocytes % 10/13/2023 9.3  2.0 - 10.0 % Final    Eosinophils % 10/13/2023 4.7  0.0 - 6.0 % Final    Basophils % 10/13/2023 1.1  0.0 - 2.0 % Final    Neutrophils Absolute 10/13/2023 3.82  1.20 - 7.70 x10*3/uL Final    Percent differential counts (%) should be interpreted in the context of the absolute cell counts (cells/uL).    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 Plan for Surveillance      Endometrial cancer determined by uterine biopsy (CMS/[facility])   10/30/2023 Initial Diagnosis    Endometrial cancer determined by uterine biopsy (CMS/[facility])       57 y.o. 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 s/p TLH/BSO/SLN in 10/2023, here for post-op    # Endometrial cancer  - Discussed the significance of histologic subtype, grade and stage  - Pathology reviewed as well as my recommendation for surveillance  - We reviewed signs and symptoms of recurrence   - We reviewed surveillance schedule  - Plan for surveillance visit in 4 months    # Post-op  - Recovering well  - Discussed ongoing restrictions (no lifting more than 10-15lbs, nothing per vagina, no soaking) for another 4 weeks    Scribe Attestation  By signing my name below, I, Bella Carreon   attest that this documentation has been prepared under the direction and in the presence of Tonya Martinez MD.    Provider Attestation - Scribe documentation    All medical record entries made by the Scribe were at my direction and personally dictated by me. I have reviewed the chart and agree that the record accurately reflects my personal performance of the history, physical exam, discussion and plan.    Tonya Martinez MD

## 2023-11-07 ENCOUNTER — OFFICE VISIT (OUTPATIENT)
Dept: PAIN MEDICINE | Facility: CLINIC | Age: 58
End: 2023-11-07
Payer: COMMERCIAL

## 2023-11-07 VITALS
WEIGHT: 186 LBS | OXYGEN SATURATION: 94 % | HEIGHT: 60 IN | HEART RATE: 73 BPM | DIASTOLIC BLOOD PRESSURE: 65 MMHG | BODY MASS INDEX: 36.52 KG/M2 | RESPIRATION RATE: 16 BRPM | SYSTOLIC BLOOD PRESSURE: 136 MMHG | TEMPERATURE: 97 F

## 2023-11-07 DIAGNOSIS — M48.061 SPINAL STENOSIS OF LUMBAR REGION WITH RADICULOPATHY: Primary | ICD-10-CM

## 2023-11-07 DIAGNOSIS — M54.16 SPINAL STENOSIS OF LUMBAR REGION WITH RADICULOPATHY: Primary | ICD-10-CM

## 2023-11-07 PROCEDURE — 99214 OFFICE O/P EST MOD 30 MIN: CPT | Performed by: ANESTHESIOLOGY

## 2023-11-07 PROCEDURE — 1036F TOBACCO NON-USER: CPT | Performed by: ANESTHESIOLOGY

## 2023-11-07 ASSESSMENT — ENCOUNTER SYMPTOMS
LOSS OF SENSATION IN FEET: 0
OCCASIONAL FEELINGS OF UNSTEADINESS: 0
DEPRESSION: 0

## 2023-11-07 ASSESSMENT — PAIN SCALES - GENERAL
PAINLEVEL: 0-NO PAIN
PAINLEVEL_OUTOF10: 0 - NO PAIN

## 2023-11-07 ASSESSMENT — PATIENT HEALTH QUESTIONNAIRE - PHQ9
2. FEELING DOWN, DEPRESSED OR HOPELESS: NOT AT ALL
SUM OF ALL RESPONSES TO PHQ9 QUESTIONS 1 AND 2: 0
1. LITTLE INTEREST OR PLEASURE IN DOING THINGS: NOT AT ALL

## 2023-11-07 ASSESSMENT — COLUMBIA-SUICIDE SEVERITY RATING SCALE - C-SSRS
1. IN THE PAST MONTH, HAVE YOU WISHED YOU WERE DEAD OR WISHED YOU COULD GO TO SLEEP AND NOT WAKE UP?: NO
6. HAVE YOU EVER DONE ANYTHING, STARTED TO DO ANYTHING, OR PREPARED TO DO ANYTHING TO END YOUR LIFE?: NO
2. HAVE YOU ACTUALLY HAD ANY THOUGHTS OF KILLING YOURSELF?: NO

## 2023-11-07 ASSESSMENT — LIFESTYLE VARIABLES: TOTAL SCORE: 0

## 2023-11-07 ASSESSMENT — PAIN - FUNCTIONAL ASSESSMENT: PAIN_FUNCTIONAL_ASSESSMENT: 0-10

## 2023-11-07 NOTE — PROGRESS NOTES
This is 57 y.o.  female with who has been treated for Lower back pain. Pain is better, The pain was relieved by the injection given on 9/5/2023 .Here for follow-up.  Patient states she got sent during her vacation as 100% pain relief.    Chief Complaint   Patient presents with    Back Pain    Follow-up     9/5/2023 left L4-5 TFESI       Pain Therapies: injections     Opioid Risk Assessment Score 0/26

## 2023-11-07 NOTE — PROGRESS NOTES
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 The patient had C5-6 and C6-7 discectomy and total disc arthroplasty on 7/8/2021 by Dr. Patel.  The patient also had significant lower back pain and radiculopathy she ended up going to the emergency department had a CT scan showed foraminal stenosis and received left L4-5 TFESI on 9/5/2023 who is here for follow-up who had total pain relief after the injection and she is very happy with the result.  The patient is to continue his physical therapy and the plan that we placed for her.  And we may address other issues with her whenever it arise.    Prior office visit:  8/22/2023: This is 57 year year old female recall past medical history of obesity BMI 37, GERD, prediabetic, history of cervical stenosis with radiculopathy that she received cervical YONATAN with excellent results. The patient had C5-6 and C6-7 discectomy and total disc arthroplasty on 7/8/2021 by Dr. Patel.    The patient has been in the emergency room for lower back pain who had a CT scan on 8/15/2023 showed L4-5 stenosis worse on the right more than the left who is here for follow-up stating that her neck and arms feel great after the disc replacement by Dr. Patel but she developed lower back pain and left leg radiculopathy that she went to the ED for the and was given pain medication in addition to cyclobenzaprine and steroids which helped with her pain but she still have significant radiculopathy in the left leg. The patient is going on a trip on September 10 to the [facility] and will try to get her left L4-5 TFESI as soon as possible. I placed the patient in chiropractic care and physical therapy as well. I refilled her tizanidine and I gave her a refill on her Percocet as well.     Last procedure:   9/5/2023  left L4-5 TFESI patient has had 100% improvement in pain and function  7/8/2021 left C6-7 interlaminar YONATAN the patient has had a 90% improvement in pain. And 100% in function  4/12/2021 right C6-7 interlaminar YONATAN patient has had 100% improvement in pain and function  11/9/2020 right C6-7 the patient has had a 100% improvement in pain. and 100% in function     Portions of record reviewed for pertinent issues: active problem list, medication list, allergies, family history, social history, notes from last encounter, encounters, lab results, imaging.        I have personally reviewed the OARRS report for this patient. This report is scanned into the electronic medical record. I have considered the risks of abuse, dependence, addiction and diversion. 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 This report is scanned into the electronic medical record. I have considered the risks of abuse, dependence, addiction and diversion. 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     Lumbar vertebral body heights  are intact, without evidence of  compression fractures. Posterior elements of the lumbar spine do not  demonstrate any evidence of acute trauma.     No abnormal intra spinous distance widening or displaced transverse  or spinous process fractures are identified. No evidence of trauma to  the posterior elements of the lumbar spine is present.     Mild intervertebral disc height loss is present at L3-L4 and L4-L5.     Although the exam is not optimized to assess the spinal canal, no  evidence of significant spinal canal stenosis is present.     Multilevel neural foraminal narrowing is present with likely at least  mild narrowing present at L3-L4 bilaterally and moderate neural  foraminal stenosis noted at the levels of L4-L5 bilaterally due to  endplate spurring, hypertrophic facet changes and bulging disc, most  pronounced at the level of L4-L5 on the right due to superimposed  right para foraminal disc protrusion..     Paraspinal musculature does not demonstrate any acute abnormality.     Paraspinal musculature does not demonstrate any acute abnormality.     IMPRESSION:  Multilevel degenerative changes of the lumbar spine are present  without evidence of significant spinal canal stenosis, although there  is multilevel degenerative neural foraminal narrowing, most  pronounced with likely at least moderate stenosis at the level of  L4-L5 on the right due to superimposed bulging disc, endplate  spurring, hypertrophic facet changes and superimposed para foraminal  disc protrusion.     10/31/2020 cervical MRI:  FINDINGS:  C2-3: There is a central disc herniation which slightly narrows the  subarachnoid space but does not deform the cord. The neural foramina  are patent.     C3-4: There is a broad-based central disc herniation which contacts  but does not deform the cord. The neural foramina are patent.     C4-5: There is broad-based posterior disc herniation which narrows  the subarachnoid space but does not deform the  cord. The neural  foramina are patent.     C5-6: There is posterior disc bulge which slightly narrows the  subarachnoid space but does not deform the cord. The neural foramina  are patent.     C6-7: There is posterior disc bulge which narrows the subarachnoid  space but does not deform the cord. There is moderate bilateral  neural foraminal stenosis secondary to hypertrophic uncovertebral  joint changes.     C7-T1: The findings are unremarkable.     The signal intensity within the cervical cord is normal.     IMPRESSION:  Multifocal disc herniations and degenerative changes as described  above       There were no vitals taken for this visit.   Review of Systems   HENT: Negative.     Eyes: Negative.    Respiratory: Negative.     Cardiovascular: Negative.    Gastrointestinal: Negative.    Endocrine: Negative.    Genitourinary: Negative.    Musculoskeletal:  Positive for back pain.   Skin: Negative.    Neurological: Negative.    Hematological: Negative.    Psychiatric/Behavioral: Negative.        Physical Exam  Vitals and nursing note reviewed.   Constitutional:       Appearance: Normal appearance.   HENT:      Head: Normocephalic and atraumatic.      Nose: Nose normal.   Eyes:      Extraocular Movements: Extraocular movements intact.      Conjunctiva/sclera: Conjunctivae normal.      Pupils: Pupils are equal, round, and reactive to light.   Cardiovascular:      Rate and Rhythm: Normal rate and regular rhythm.      Pulses: Normal pulses.      Heart sounds: Normal heart sounds.   Pulmonary:      Effort: Pulmonary effort is normal.      Breath sounds: Normal breath sounds.   Abdominal:      General: Abdomen is flat. Bowel sounds are normal.      Palpations: Abdomen is soft.   Musculoskeletal:         General: Tenderness present.   Skin:     General: Skin is warm.   Neurological:      General: No focal deficit present.      Mental Status: She is alert and oriented to person, place, and time.   Psychiatric:         Mood  and Affect: Mood normal.         Behavior: Behavior normal.                      Plan  At least 50% of the visit was involved in the discussion of the options for treatment. We discussed exercises, medication, interventional therapies and surgery. Healthy life style is essential with patient hard work to achieve the wellness. In addition; discussion with the patient and/or family about any of the diagnostic results, impressions and/or recommended diagnostic studies, prognosis, risks and benefits of treatment options, instructions for treatment and/or follow-up, importance of compliance with chosen treatment options, risk-factor reduction, and patient/family education.         Pool therapy, walking in the pool, at least 3x per week for 30 minutes  Continue self-directed physical therapy  Consider repeat left L4-5 TFESI when patient calls, order placed  Healthy lifestyle and anti-inflammatory diet in addition to weight control discussed with the patient  Alternative chronic pain therapies was discussed, encouraged and information was handed  Return to Clinic as needed     *Please note this report has been produced using speech recognition software and may contain errors related to that system including grammar, punctuation and spelling as well as words and phrases that may be inappropriate. If there are questions or concerns, please feel free to contact me to clarify.    Jeison Arauz MD

## 2023-11-08 ASSESSMENT — ENCOUNTER SYMPTOMS
HEMATOLOGIC/LYMPHATIC NEGATIVE: 1
BACK PAIN: 1
NEUROLOGICAL NEGATIVE: 1
EYES NEGATIVE: 1
CARDIOVASCULAR NEGATIVE: 1
RESPIRATORY NEGATIVE: 1
PSYCHIATRIC NEGATIVE: 1
GASTROINTESTINAL NEGATIVE: 1
ENDOCRINE NEGATIVE: 1

## 2023-11-09 ENCOUNTER — TELEPHONE (OUTPATIENT)
Dept: GYNECOLOGIC ONCOLOGY | Facility: HOSPITAL | Age: 58
End: 2023-11-09
Payer: COMMERCIAL

## 2023-11-21 ENCOUNTER — TELEPHONE (OUTPATIENT)
Dept: GYNECOLOGIC ONCOLOGY | Facility: HOSPITAL | Age: 58
End: 2023-11-21
Payer: COMMERCIAL

## 2023-12-04 ENCOUNTER — TRANSCRIBE ORDERS (OUTPATIENT)
Dept: PHYSICAL THERAPY | Facility: CLINIC | Age: 58
End: 2023-12-04
Payer: COMMERCIAL

## 2023-12-04 DIAGNOSIS — M99.03 LUMBOSACRAL DYSFUNCTION: Primary | ICD-10-CM

## 2023-12-04 DIAGNOSIS — M54.30 SCIATICA, UNSPECIFIED LATERALITY: ICD-10-CM

## 2023-12-06 ENCOUNTER — ALLIED HEALTH (OUTPATIENT)
Dept: INTEGRATIVE MEDICINE | Facility: CLINIC | Age: 58
End: 2023-12-06
Payer: COMMERCIAL

## 2023-12-06 VITALS
SYSTOLIC BLOOD PRESSURE: 138 MMHG | HEART RATE: 77 BPM | BODY MASS INDEX: 36.72 KG/M2 | WEIGHT: 188 LBS | DIASTOLIC BLOOD PRESSURE: 83 MMHG

## 2023-12-06 DIAGNOSIS — K21.9 GASTROESOPHAGEAL REFLUX DISEASE WITHOUT ESOPHAGITIS: ICD-10-CM

## 2023-12-06 DIAGNOSIS — R53.83 FATIGUE, UNSPECIFIED TYPE: Primary | ICD-10-CM

## 2023-12-06 DIAGNOSIS — E55.9 VITAMIN D DEFICIENCY: ICD-10-CM

## 2023-12-06 DIAGNOSIS — R73.03 PREDIABETES: ICD-10-CM

## 2023-12-06 PROCEDURE — 99215 OFFICE O/P EST HI 40 MIN: CPT | Performed by: PHYSICIAN ASSISTANT

## 2023-12-06 NOTE — PROGRESS NOTES
56 y/o female with PMH chronic neck and LBP, endometrial cancer, GERD, vitamin D deficiency presents for initial consultation. 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 Lives with  (retired), two daughters, and two dogs. Social Support- family.     Goal of Visit: whole health evaluation with focus on weight, OA, and cancer    Schedule: M-F, 8-5, hybrid. Enjoys her work. Evenings and weekends- spends time with family, walking, hiking.     Motivation for Change: cancer diagnosis    Somatic Complaints:  - Neck pain- s/p surgery- treated with YONATAN effectively   - Low back pain with occ RLE sciatica- believes due to dehydration; wears heels daily; start PT this week  - Endometrial cancer- caught early, s/p surgery 6 weeks ago; under surveillance  - RSV- within the last month  - Fatigue- worsened since recent virus; mental and physical; believes depression could also be a cause  - Depression- has tried SAD lamp but worsened dry eyes  - Weight- lost 50# 10 years ago, then gradually increased again; was on severe calorie restriction (1000) and taking multiple supplements  - Hx of kidney stones  - Gut health risk factors- +steroids, +antibiotics, +stress, +caffeine  - GI- heartburn/coughing/choking (takes omeprazole daily; EGD 4/2023 reviewed), some constipation (uses stool softener daily), +flatulence and belching from carbonated drinks  - - s/p sling; well-tolerated  - FH- DMII     Sleep Hygiene: Averages 8 hours. Sleep aid- 50 mg diphenhydramine x many years-  helps with sleep maintenance and initiation. Worsened with menopause.  - Caffeine- diet Dr. Pepper- was 4-5/day, now few per week   - Water- 40-50 oz   - ETOH- occasionally   - Late night eating- none    Physical Activity:  [facility] membership. Enjoys biking and walking. Has a standing desk. Pending PT evaluation    Stress Management: REVIEW AT NEXT VISIT  - Loss of three older brothers in recent years, most recently in August- no grief  counseling  - Screen use     Toxic Substance Use: Red Wine few times per week, 1-3x/week    Nutrition: intermittent fasting between 11a-7p. Prefers carbonation, does not like plain water.  is gluten free. Would like to get back into the rhythm of cooking. REVIEW AT NEXT VISIT    Assessment/Plan:  - Fatigue and weight mgmt- Vitamin D, A1C, B12, folate  - 10 minutes of intentional time in nature   - wearing heels daily?  - sleep hygiene- reading a book instead of screen  - diluted pomegranate juice   - Plan for eventual med changes- switch to DGL plus from Omeprazole, stop diphenhydramine      Review of Systems:  Constitutional: afebrile  Respiratory: no shortness of breath  Cardiovascular: no chest  pain  Abdominal: no abdominal pain, no n/v/d  Musculoskeletal: +neck and LBP   Skin: no rash      Physical Exam:   General: alert and oriented; well-developed, well-nourished, no acute distress  HEENT: normocephalic, atraumatic, good eye contact  Respiratory: no labored breathing, no conversational dyspnea  Musculoskeletal: moving all extremities appropriately  Neurology: normal gait  Psych: pleasant affect, cooperative

## 2023-12-06 NOTE — PATIENT INSTRUCTIONS
"Complete labs prior to next visit.   Spend 10 intentional minutes in nature (phone free!). While outside, name:  5 things you see  4 things you hear  3 things you smell  2 things you feel  1 thing you taste  Work on optimization of sleep hygiene:  Review handout  Set an alarm 1 hour prior to bed time-- turn off screens, reduce house lighting, then create a \"wind down routine.\"  Switch to reading a physical book instead of screen  Expose yourself to bright light within the first 30 minutes of waking up  Fluids:  Goal to continue to reduce carbonated beverages for less digestive symptoms.   [place] with 2 oz of organic pomegranate juice diluted in 8 oz of ice water.   Olipop is a brand of idio water sold at [employer]. It promotes better gut health through prebiotics and fiber. Try it out to see if you like it. Limit to 4 per week.   Physical Activity:  Agree with physical therapy   Discuss impact of wearing heels on low back pain.  "

## 2023-12-08 ENCOUNTER — EVALUATION (OUTPATIENT)
Dept: PHYSICAL THERAPY | Facility: CLINIC | Age: 58
End: 2023-12-08
Payer: COMMERCIAL

## 2023-12-08 DIAGNOSIS — R29.898 WEAKNESS OF BOTH LOWER EXTREMITIES: ICD-10-CM

## 2023-12-08 DIAGNOSIS — M99.03 LUMBOSACRAL DYSFUNCTION: Primary | ICD-10-CM

## 2023-12-08 PROCEDURE — 97110 THERAPEUTIC EXERCISES: CPT | Mod: GP

## 2023-12-08 PROCEDURE — 97161 PT EVAL LOW COMPLEX 20 MIN: CPT | Mod: GP

## 2023-12-08 ASSESSMENT — PATIENT HEALTH QUESTIONNAIRE - PHQ9
1. LITTLE INTEREST OR PLEASURE IN DOING THINGS: NOT AT ALL
2. FEELING DOWN, DEPRESSED OR HOPELESS: NOT AT ALL
SUM OF ALL RESPONSES TO PHQ9 QUESTIONS 1 AND 2: 0

## 2023-12-08 ASSESSMENT — ENCOUNTER SYMPTOMS
OCCASIONAL FEELINGS OF UNSTEADINESS: 0
DEPRESSION: 0
LOSS OF SENSATION IN FEET: 0

## 2023-12-08 NOTE — PROGRESS NOTES
8Physical Therapy Evaluation    Patient Name: Candi Varner  MRN: [id number]    Current Problem  1. Lumbosacral dysfunction        2. Weakness of both lower extremities          Insurance    Insurance reviewed   Visit number: 1  Approved number of visits: 30  **insurance name  [employer]  **visits/yr or copay 25.00  **No Auth req      Subjective   General:  Pt comes in today with low back pain. She states this started in September right before she went on vacation after she twisted her back the wrong way. She states they gave her a steroid which helped. She states it then came back but she thinks it was a bout of dehydration. She states she is trying to watch her water intake. She states she knows her back needs to be stronger. She states she joined the wellness center in [place]. Of note, she recently had a hysterectomy after they found uterine cancer but she does not need any other treatments at this time. Her current back pain would be rated 0/10. She states the injection took away 100% of her pain. When she does have the pain, it usually effects her RLE. Hx of discectomy. 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   PLOF: Independent with all activities  Goal for Therapy: Strengthen her back so that OA does not advance  Home Environment: House, 3 levels, lives with , 2 daughters    Precautions:    Pain:  REDUCES SYMPTOMS: Rest    PROVOKES SYMPTOMS: Twisting    Reviewed medical screening form with pt and medical screening assessed    Imaging:   IMPRESSION:  Multilevel degenerative changes of the lumbar spine are present  without evidence of significant spinal canal stenosis, although there  is multilevel degenerative neural foraminal narrowing, most  pronounced with likely at least moderate stenosis at the level of  L4-L5 on the right due to superimposed bulging disc, endplate  spurring, hypertrophic facet changes and superimposed para  foraminal  disc protrusion.    Objective   PALPATION: No TTP            GAIT: Slow gait speed, dec stance time on R            POSTURE: Rounded shoulders posture    AROM    Lumbar flexion: WNL      Lumbar extension: Limited 25%      Lumbar sidebending right: limited 25%      Sidebending left: limited 25%      Lumbar rotation right: WNL     Rotation left: WNL      Hip flexion right:  WNL    Hip flexion left: WNL      Hip extension right: WNL     Hip extension left: WNL      Hip ER supine right: WNL     Hip ER supine left: WNL      Hip IR supine right: WNL     Hip IR supine left: WNL      MMT:      (L3-L4) Right quadriceps: 5     Left quadriceps: 5      (L1-L2) Right iliopsoas: 4+      Left iliopsoas: 4+      (S2) Right hip abductors supine: 4-     Left hip abductors supine: 4      (L1-L2) Right hip adductors supine: 4-     Left hip adductors supine: 4-      (L5-S1) Right gluteus alan: 4     Left gluteus alan: 4      (S1-S2) Right hamstrings: 5      Left hamstrings: 5      Lower abdominals: 4-      Upper abdominals: 4-      Flexibility:    Right iliopsoas: limited     Left iliopsoas: WNL      Right rectus femoris:  Limited    Left rectus femoris: WNL      Right hamstring: limited     Left hamstring: limited      Right ITB: limited     Left ITB: limited      Right piriformis: limited     Left piriformis: WNL      Right quadriceps: WNL     Left quadriceps: WNL      Special Tests:  Active Straight Leg Raise Test: Right -  Left -    Crossed Straight Leg Test: Right -  Left -      Outcome Measures:  2% JEAN    Treatment: See HEP below    EDUCATION/HEP:  Access Code: [email or web address]  URL: [email or web address]  Date: 12/08/2023  Prepared by: Kelly Randle    Exercises  - Supine Bridge  - 1 x daily - 7 x weekly - 3 sets - 10 reps  - Supine Active Straight Leg Raise  - 1 x daily - 7 x weekly - 3 sets - 10 reps  - Hooklying Clamshell with Resistance  - 1 x daily - 7 x weekly - 3 sets  - 10 reps  - Supine Transversus Abdominis Bracing - Hands on Stomach  - 1 x daily - 7 x weekly - 3 sets - 10 reps  - Supine March  - 1 x daily - 7 x weekly - 3 sets - 10 reps  - Prone Hip Extension  - 1 x daily - 7 x weekly - 3 sets - 10 reps  - Supine Hamstring Stretch with Strap  - 1 x daily - 7 x weekly - 3 sets - 30s hold  Assessment:  56 y/o pt who presents with dec strength, dec flexibility, and dec functional mobility. Functional limitations include walking long distances, twisting motions, and recreational activities. Pt will benefit from skilled therapy in order to improve strength, flexibility, and functional mobility.    Clinical Presentation: Stable    Level of Complexity: Low     Goals:  Pt will be independent with HEP  Pt will report dec of 6% on JEAN (MDC) in order to improve functional mobility  Pt will demonstrate an increase in global hip strength to 5/5 in order to improve functional mobility  Pt will demonstrate increased core strength to 5/5 in order to improve functional mobility  Pt will be able to ascend/descend stairs in a reciprocal pattern in order to return to ADLs  Pt will report at least 75% improvement by end of therapy      Plan  OP PT Plan  Treatment/Interventions: Cryotherapy, Education/ Instruction, Manual therapy, Neuromuscular re-education, Self care/ home management, Therapeutic activities, Therapeutic exercises  PT Plan: Skilled PT  PT Frequency: 1 time per week  Duration: 4 weeks  Certification Period Start Date: 12/08/23  Certification Period End Date: 01/07/24  Number of Treatments Authorized: 30  Rehab Potential: Good  Plan of Care Agreement: Patient

## 2023-12-19 ENCOUNTER — TREATMENT (OUTPATIENT)
Dept: PHYSICAL THERAPY | Facility: CLINIC | Age: 58
End: 2023-12-19
Payer: COMMERCIAL

## 2023-12-19 DIAGNOSIS — M99.03 LUMBOSACRAL DYSFUNCTION: ICD-10-CM

## 2023-12-19 DIAGNOSIS — R29.898 WEAKNESS OF BOTH LOWER EXTREMITIES: Primary | ICD-10-CM

## 2023-12-19 PROCEDURE — 97110 THERAPEUTIC EXERCISES: CPT | Mod: GP,CQ

## 2023-12-19 ASSESSMENT — PAIN - FUNCTIONAL ASSESSMENT: PAIN_FUNCTIONAL_ASSESSMENT: 0-10

## 2023-12-19 ASSESSMENT — PAIN SCALES - GENERAL: PAINLEVEL_OUTOF10: 1

## 2023-12-19 ASSESSMENT — PAIN DESCRIPTION - DESCRIPTORS: DESCRIPTORS: TIGHTNESS

## 2023-12-19 NOTE — PROGRESS NOTES
"Physical Therapy Treatment    Patient Name: Candi Varner  MRN: [id number]  Today's Date: 12/19/2023  Time Calculation  Start Time: 1105  Stop Time: 1145  Time Calculation (min): 40 min  Insurance:   Visit number: 2  Approved number of visits: 30  **insurance name  [employer]  **visits/yr or copay 25.00  **No Auth req      Assessment:   Pt reports \"some cramping\" in the R Hamstring w/ completion of Bridging ex. Progressed her program today w/ addition of Walkouts w/ TA, Hip flexor stretch, and stdg hip abd and ext. Emphasis on core stability and pacing w/ great f/t. Pt motivated to progress her program. She will cont to benefit from skilled PT to address her deficits and improve her overall functional ability.     Plan:  OP PT Plan  PT Plan: Skilled PT  Rehab Potential: Good Cont to progress as lila.     Current Problem  1. Weakness of both lower extremities        2. Lumbosacral dysfunction            Subjective   General   \"It's just mainly stiff but not really painful.\"   Precautions     Pain  Pain Assessment: 0-10  Pain Score: 1  Pain Location: Back (upper buttock area)  Pain Orientation: Right  Pain Descriptors: Tightness    Objective     Treatments:  Therapeutic Exercise ([id number]):  Supine Bridge 2x10  SLR w TA 2x10 BL  Supine Marching 2x10   Clamshells 2x10   Walkouts w/ TA x10   Prone Hip Extension x10 ea  Supine HS Stretch w/ Strap 30\" x 2 BL  Supine Calf Stretch w/ Strap 30\" x 2 BL  Hip flexor stretch 30\" x 2 BL  Stdg Hip Abd, Ext x10 BL     EDUCATION:  Outpatient Education  Education Comment: Compliant w/ HEP 1x/day  Access Code: [email or web address]  URL: [email or web address]  Date: 12/19/2023  Prepared by: Artem Jones    Exercises  - Modified Pascual Stretch  - 1 x daily - 7 x weekly - 1 sets - 3 reps - 20-30 sec hold    "

## 2023-12-28 ENCOUNTER — TREATMENT (OUTPATIENT)
Dept: PHYSICAL THERAPY | Facility: CLINIC | Age: 58
End: 2023-12-28
Payer: COMMERCIAL

## 2023-12-28 DIAGNOSIS — M99.03 LUMBOSACRAL DYSFUNCTION: Primary | ICD-10-CM

## 2023-12-28 DIAGNOSIS — R29.898 WEAKNESS OF BOTH LOWER EXTREMITIES: ICD-10-CM

## 2023-12-28 PROCEDURE — 97110 THERAPEUTIC EXERCISES: CPT | Mod: GP

## 2023-12-28 NOTE — PROGRESS NOTES
Physical Therapy Treatment    Patient Name: Candi Varner  MRN: [id number]  Today's Date: 12/28/2023  Time Calculation  Start Time: 0500  Stop Time: 0533  Time Calculation (min): 33 min  Insurance:   Visit number: 3  Approved number of visits: 30  **insurance name  [facility]  **visits/yr or copay 25.00  **No Auth req      Assessment:   Pt has made improvements since starting therapy in both strength and ROM. Pt has the knowledge and understanding of HEP at this time to continue on at home. Pt has access to gym and pool and will be able to continue with exercises on her own. Pt will be discharged and encouraged to call with any questions or concerns.    Plan:    Pt discharged with HEP, encouraged to call with any questions or concerns.    Current Problem  1. Lumbosacral dysfunction        2. Weakness of both lower extremities              Subjective   General   Pt comes in today and states she is feeling pretty good. She would like today to be her last visit.  Precautions     Pain       Objective   AROM    Lumbar flexion: WNL      Lumbar extension: Limited WNL    Lumbar sidebending right: limited WNL      Sidebending left: limited 25%      Lumbar rotation right: WNL     Rotation left: WNL      Hip flexion right:  WNL    Hip flexion left: WNL      Hip extension right: WNL     Hip extension left: WNL      Hip ER supine right: WNL     Hip ER supine left: WNL      Hip IR supine right: WNL     Hip IR supine left: WNL       MMT:       (L3-L4) Right quadriceps: 5     Left quadriceps: 5      (L1-L2) Right iliopsoas: 4+      Left iliopsoas: 4+      (S2) Right hip abductors supine: 4+    Left hip abductors supine: 4+      (L1-L2) Right hip adductors supine: 4     Left hip adductors supine: 4      (L5-S1) Right gluteus alan: 4+     Left gluteus alan: 4+      (S1-S2) Right hamstrings: 5      Left hamstrings: 5      Lower abdominals: 4      Upper abdominals: 4   Treatments:  Therapeutic Exercise  "([id number]):  Supine Bridge 2x10  Goal review  SLR w TA 2x10 BL  Supine Marching 2x10   Clamshells 2x10   Walkouts w/ TA x10   Prone Hip Extension x10 ea  Supine HS Stretch w/ Strap 30\" x 2 BL  Supine Calf Stretch w/ Strap 30\" x 2 BL  Hip flexor stretch 30\" x 2 BL  Stdg Hip Abd, Ext x10 BL YTB  Reverse crunch 2x10  Paloff press 2x10 GTB  EDUCATION:     Access Code: [email or web address]  URL: [email or web address]  Date: 12/28/2023  Prepared by: Kelly Randle    Exercises  - Supine Bridge  - 1 x daily - 7 x weekly - 3 sets - 10 reps  - Supine Hamstring Stretch with Strap  - 1 x daily - 7 x weekly - 3 sets - 30s hold  - Modified Pascual Stretch  - 1 x daily - 7 x weekly - 3 sets - 30s hold  - Supine walkout  - 1 x daily - 7 x weekly - 2 sets - 10 reps  - Reverse Crunch with Knees Bent  - 1 x daily - 7 x weekly - 2 sets - 5 reps  - Clamshell with Resistance  - 1 x daily - 7 x weekly - 2 sets - 10 reps  - Standing Anti-Rotation Press with Anchored Resistance  - 1 x daily - 7 x weekly - 2 sets - 10 reps  - Prone Hip Extension  - 1 x daily - 7 x weekly - 2 sets - 10 reps  - Standing Hip Abduction with Counter Support  - 1 x daily - 7 x weekly - 2 sets - 10 reps  - Standing Hip Extension with Counter Support  - 1 x daily - 7 x weekly - 2 sets - 10 reps    Pt will be independent with HEP MET  Pt will report dec of 6% on JEAN (MDC) in order to improve functional mobility MET  Pt will demonstrate an increase in global hip strength to 5/5 in order to improve functional mobility Partially met  Pt will demonstrate increased core strength to 5/5 in order to improve functional mobility Partially met  Pt will be able to ascend/descend stairs in a reciprocal pattern in order to return to ADLs met  Pt will report at least 75% improvement by end of therapy met  "

## 2024-01-02 ENCOUNTER — APPOINTMENT (OUTPATIENT)
Dept: INTEGRATIVE MEDICINE | Facility: CLINIC | Age: 59
End: 2024-01-02
Payer: COMMERCIAL

## 2024-01-09 ENCOUNTER — ALLIED HEALTH (OUTPATIENT)
Dept: INTEGRATIVE MEDICINE | Facility: CLINIC | Age: 59
End: 2024-01-09
Payer: COMMERCIAL

## 2024-01-09 VITALS
WEIGHT: 185 LBS | SYSTOLIC BLOOD PRESSURE: 119 MMHG | DIASTOLIC BLOOD PRESSURE: 72 MMHG | BODY MASS INDEX: 36.13 KG/M2 | HEART RATE: 77 BPM

## 2024-01-09 DIAGNOSIS — R73.03 PREDIABETES: ICD-10-CM

## 2024-01-09 DIAGNOSIS — Z71.3 NUTRITIONAL COUNSELING: ICD-10-CM

## 2024-01-09 DIAGNOSIS — K21.9 GASTROESOPHAGEAL REFLUX DISEASE WITHOUT ESOPHAGITIS: ICD-10-CM

## 2024-01-09 DIAGNOSIS — R53.83 FATIGUE, UNSPECIFIED TYPE: Primary | ICD-10-CM

## 2024-01-09 PROCEDURE — 99215 OFFICE O/P EST HI 40 MIN: CPT | Performed by: PHYSICIAN ASSISTANT

## 2024-01-09 NOTE — PROGRESS NOTES
"58 y/o female with PMH chronic neck and LBP, endometrial cancer, GERD, vitamin D deficiency presents for follow up. 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 Lives with  (retired), two daughters (fiorella and Maura), and two dogs. Social Support- family.       Successes:  - Taking walks outside- 3-4 days per week; this helps her unwind  - Fluids- no plain water yet; increasing homemade lemonade (organic concentrated lemon juice, stevia)   - Went through physical therapy- working on back and core strengthening- back pain resolved   - Finished health assessment at  [facility]. Goal of 4 days per week with daughter at gym with mix of aerobic and strength.      Challenges:  - Started a new study, and she has to be at work by 6am some days  - Now going into the office 2-3x/week-- commute is long with bad traffic  - Trying to go to bed earlier  - Notices she was lightheaded with high sugar intake (coca cola)  - GI symptoms- needs stool softeners and PPI; symptoms worsened around holidays then balanced out to her norm    Nutrition: intermittent fasting between 11a-7p. Prefers carbonation, does not like plain water.  is gluten free, and he has been cooking. Would like to get back into the rhythm of cooking. No lunch meats. Order in 2-3x/week.  Limits refined carbs. \"Felt burnt out\" with cooking [place].    B- zone bar with Simona and Velazco Pelligrino; bran cereal with coconut milk  L- sandwich- sourdough bread, tuna, egg salad, leftovers; Simona and Velazco Pelligrino or homemade lemonade  D- spaghetti and meatballs; takeout- chipotle, bibibop, subs  Snacks- chocolate, popcorn  Drinks- as above, 1 cup of wine per night     Reviewed few food labels. Discussed overarching goal to reduce packaged/processed foods and increase nutrient dense, whole foods.   Discussed few breakfast swaps.  Discussed how fatigue could be a result of low protein and limited nutritional diversity. Will begin with one " meal change at a time for sustainable change.          Plan:  - mood lamp  - breakfast swaps  - supplements- gi revive  - etoh reduction to 3x/week  - gut health to whole health    Next visit:  Sleep?  Lunch  Stress              RECALL 12/06/2023:  <<<Goal of Visit: whole health evaluation with focus on weight, OA, and cancer     Schedule: M-F, 8-5, hybrid. Enjoys her work. Evenings and weekends- spends time with family, walking, hiking.      Motivation for Change: cancer diagnosis     Somatic Complaints:  - Neck pain- s/p surgery- treated with YONATAN effectively   - Low back pain with occ RLE sciatica- believes due to dehydration; wears heels daily; start PT this week  - Endometrial cancer- caught early, s/p surgery 6 weeks ago; under surveillance  - RSV- within the last month  - Fatigue- worsened since recent virus; mental and physical; believes depression could also be a cause  - Depression- has tried SAD lamp but worsened dry eyes  - Weight- lost 50# 10 years ago, then gradually increased again; was on severe calorie restriction (1000) and taking multiple supplements  - Hx of kidney stones  - Gut health risk factors- +steroids, +antibiotics, +stress, +caffeine  - GI- heartburn/coughing/choking (takes omeprazole daily; EGD 4/2023 reviewed), some constipation (uses stool softener daily), +flatulence and belching from carbonated drinks  - - s/p sling; well-tolerated  - FH- DMII      Sleep Hygiene: Averages 8 hours. Sleep aid- 50 mg diphenhydramine x many years-  helps with sleep maintenance and initiation. Worsened with menopause.  - Caffeine- diet Dr. Pepper- was 4-5/day, now few per week   - Water- 40-50 oz   - ETOH- occasionally   - Late night eating- none     Physical Activity:  [facility] membership. Enjoys biking and walking. Has a standing desk. Pending PT evaluation     Stress Management: REVIEW AT NEXT VISIT  - Loss of three older brothers in recent years, most recently in August- no grief  counseling  - Screen use      Toxic Substance Use: Red Wine few times per week, 1-3x/week     Nutrition: intermittent fasting between 11a-7p. Prefers carbonation, does not like plain water.  is gluten free. Would like to get back into the rhythm of cooking. REVIEW AT NEXT VISIT             Review of Systems:  Constitutional: afebrile  Respiratory: no shortness of breath  Cardiovascular: no chest  pain  Abdominal: no abdominal pain, no n/v/d  Musculoskeletal: +neck and LBP   Skin: no rash        Physical Exam:   General: alert and oriented; well-developed, well-nourished, no acute distress  HEENT: normocephalic, atraumatic, good eye contact  Respiratory: no labored breathing, no conversational dyspnea  Musculoskeletal: moving all extremities appropriately  Neurology: normal gait  Psych: pleasant affect, cooperative

## 2024-01-10 NOTE — PATIENT INSTRUCTIONS
Keep up the good work with spending time in nature, physical therapy exercises, and increasing hydration!  Complete non-fasting labs prior to next office visit.  Continue to work on sleep optimization  Expose yourself to bright light within 30 minutes of waking up  Natural light or house lighting  Avoid screens one hour before bed  Gut Health and Nutrition:  Goal of eating less processed/packaged foods and more nutrient dense, whole foods.   Breakfast swaps:  Green smoothie with protein source (i.e. nuts, seeds, protein powder).  Avocado toast with chopped nuts or hard boild egg  Protein packed muffins  Egg muffin cups with veggies  [email or web address]   Reduce alcohol to three drinks per week  Alcohol is a toxin to the brain, liver, gut, and it has been linked to several types of cancer. It is also a sleep disrupter.   This is also contributing to heartburn and reflux symptoms.   Try to reduce your overall intake for long-term health and well-being.   Supplements:  Leaky Gut Revive x 1-2 months (until symptoms improve)  References:  Gut Health to Whole Health handout

## 2024-01-18 ENCOUNTER — APPOINTMENT (OUTPATIENT)
Dept: PRIMARY CARE | Facility: CLINIC | Age: 59
End: 2024-01-18
Payer: COMMERCIAL

## 2024-02-08 ENCOUNTER — LAB (OUTPATIENT)
Dept: LAB | Facility: LAB | Age: 59
End: 2024-02-08
Payer: COMMERCIAL

## 2024-02-08 ENCOUNTER — OFFICE VISIT (OUTPATIENT)
Dept: PRIMARY CARE | Facility: CLINIC | Age: 59
End: 2024-02-08
Payer: COMMERCIAL

## 2024-02-08 VITALS
WEIGHT: 183.86 LBS | BODY MASS INDEX: 36.1 KG/M2 | TEMPERATURE: 98.5 F | SYSTOLIC BLOOD PRESSURE: 118 MMHG | HEIGHT: 60 IN | DIASTOLIC BLOOD PRESSURE: 84 MMHG

## 2024-02-08 DIAGNOSIS — Z00.00 WELL ADULT EXAM: Primary | ICD-10-CM

## 2024-02-08 DIAGNOSIS — H93.13 TINNITUS OF BOTH EARS: ICD-10-CM

## 2024-02-08 DIAGNOSIS — E66.9 CLASS 2 OBESITY WITHOUT SERIOUS COMORBIDITY WITH BODY MASS INDEX (BMI) OF 35.0 TO 35.9 IN ADULT, UNSPECIFIED OBESITY TYPE: ICD-10-CM

## 2024-02-08 DIAGNOSIS — Z00.00 WELL ADULT EXAM: ICD-10-CM

## 2024-02-08 DIAGNOSIS — R53.83 FATIGUE, UNSPECIFIED TYPE: ICD-10-CM

## 2024-02-08 DIAGNOSIS — R73.03 PREDIABETES: ICD-10-CM

## 2024-02-08 DIAGNOSIS — Z78.0 POSTMENOPAUSAL: ICD-10-CM

## 2024-02-08 DIAGNOSIS — K21.9 GASTROESOPHAGEAL REFLUX DISEASE WITHOUT ESOPHAGITIS: ICD-10-CM

## 2024-02-08 DIAGNOSIS — Z12.31 ENCOUNTER FOR SCREENING MAMMOGRAM FOR MALIGNANT NEOPLASM OF BREAST: ICD-10-CM

## 2024-02-08 DIAGNOSIS — E55.9 VITAMIN D DEFICIENCY: ICD-10-CM

## 2024-02-08 LAB
25(OH)D3 SERPL-MCNC: 27 NG/ML (ref 30–100)
ALBUMIN SERPL BCP-MCNC: 4.3 G/DL (ref 3.4–5)
ALP SERPL-CCNC: 58 U/L (ref 33–136)
ALT SERPL W P-5'-P-CCNC: 23 U/L (ref 7–45)
ANION GAP SERPL CALC-SCNC: 11 MMOL/L (ref 10–20)
AST SERPL W P-5'-P-CCNC: 23 U/L (ref 9–39)
BILIRUB SERPL-MCNC: 0.6 MG/DL (ref 0–1.2)
BUN SERPL-MCNC: 9 MG/DL (ref 6–23)
CALCIUM SERPL-MCNC: 9.3 MG/DL (ref 8.6–10.3)
CHLORIDE SERPL-SCNC: 105 MMOL/L (ref 98–107)
CO2 SERPL-SCNC: 29 MMOL/L (ref 21–32)
CREAT SERPL-MCNC: 0.79 MG/DL (ref 0.5–1.05)
EGFRCR SERPLBLD CKD-EPI 2021: 86 ML/MIN/1.73M*2
EST. AVERAGE GLUCOSE BLD GHB EST-MCNC: 120 MG/DL
FOLATE SERPL-MCNC: >22.3 NG/ML
GLUCOSE SERPL-MCNC: 96 MG/DL (ref 74–99)
HBA1C MFR BLD: 5.8 %
POTASSIUM SERPL-SCNC: 4.1 MMOL/L (ref 3.5–5.3)
PROT SERPL-MCNC: 7.3 G/DL (ref 6.4–8.2)
SODIUM SERPL-SCNC: 141 MMOL/L (ref 136–145)
VIT B12 SERPL-MCNC: 375 PG/ML (ref 211–911)

## 2024-02-08 PROCEDURE — 1036F TOBACCO NON-USER: CPT | Performed by: FAMILY MEDICINE

## 2024-02-08 PROCEDURE — 82306 VITAMIN D 25 HYDROXY: CPT

## 2024-02-08 PROCEDURE — 82746 ASSAY OF FOLIC ACID SERUM: CPT

## 2024-02-08 PROCEDURE — 80053 COMPREHEN METABOLIC PANEL: CPT

## 2024-02-08 PROCEDURE — 82607 VITAMIN B-12: CPT

## 2024-02-08 PROCEDURE — 36415 COLL VENOUS BLD VENIPUNCTURE: CPT

## 2024-02-08 PROCEDURE — 83036 HEMOGLOBIN GLYCOSYLATED A1C: CPT

## 2024-02-08 PROCEDURE — 99396 PREV VISIT EST AGE 40-64: CPT | Performed by: FAMILY MEDICINE

## 2024-02-08 PROCEDURE — 3008F BODY MASS INDEX DOCD: CPT | Performed by: FAMILY MEDICINE

## 2024-02-08 ASSESSMENT — ENCOUNTER SYMPTOMS
SLEEP DISTURBANCE: 1
CONSTITUTIONAL NEGATIVE: 1
RESPIRATORY NEGATIVE: 1
NEUROLOGICAL NEGATIVE: 1
PALPITATIONS: 1
CONSTIPATION: 1

## 2024-02-08 ASSESSMENT — PATIENT HEALTH QUESTIONNAIRE - PHQ9
2. FEELING DOWN, DEPRESSED OR HOPELESS: NOT AT ALL
1. LITTLE INTEREST OR PLEASURE IN DOING THINGS: NOT AT ALL
SUM OF ALL RESPONSES TO PHQ9 QUESTIONS 1 AND 2: 0

## 2024-02-08 NOTE — PROGRESS NOTES
Subjective   Patient ID: Candi Varner is a 58 y.o. female who presents for Annual Exam.    Pt presents to \[facility]\"" care and for annual PHE      She is following once a month with  Integrative Medicine   P Med Hx: Uterine CA     - vaginal deliveries  No history pf abnormal PAPs      P Surg Hx: Total Hysterectomy (Oct 2023)  Follows with GYN/ONC next appt 2024  Transvaginal Mesh sling for bladder prolapse    C4-C7 disc replacement, about 2.5 years ago       HM: due for mammogram last ,no abnormal per pt  C scope: , repeat in  per epic review    Soc Hx: caffeine- rare  Tobacco: none  Alcohol: occ wine  Ex[facility]: [facility] [place], 3-4 times per week   Her weight is stable   Walks her dogs  Has standing desk   She has not yet met with nutrition, she plans to w/ integrative med      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    Respiratory: Negative.     Cardiovascular:  Positive for palpitations.        Occ palpitations, has been present for awhile   Gastrointestinal:  Positive for constipation.        Uses stool softeners  Since her GYN surgery   Genitourinary: Negative.    Neurological: Negative.    Psychiatric/Behavioral:  Positive for sleep disturbance.         She takes OTC sleep med for awhile  Integrative med is helping her to with this        Objective   /84 (BP Location: Right arm, Patient Position: Sitting)   Temp 36.9 °C (98.5 °F)   Ht 1.524 m (5')   Wt 83.4 kg (183 lb 13.8 oz)   BMI 35.91 kg/m²     Physical Exam  Vitals and nursing note reviewed.   Constitutional:       Appearance: Normal appearance. She is obese.   HENT:      Right Ear: There is impacted cerumen.      Left Ear: Tympanic membrane, ear canal and external ear normal.   Eyes:      Extraocular Movements:  Extraocular movements intact.      Pupils: Pupils are equal, round, and reactive to light.   Cardiovascular:      Rate and Rhythm: Normal rate and regular rhythm.      Heart sounds: Normal heart sounds.   Pulmonary:      Effort: Pulmonary effort is normal.      Breath sounds: Normal breath sounds.   Abdominal:      General: Bowel sounds are normal.      Palpations: Abdomen is soft.   Musculoskeletal:         General: Normal range of motion.   Skin:     General: Skin is warm and dry.   Neurological:      General: No focal deficit present.      Mental Status: She is alert and oriented to person, place, and time.   Psychiatric:         Mood and Affect: Mood normal.         Behavior: Behavior normal.         Thought Content: Thought content normal.         Judgment: Judgment normal.       Pt presents to [facility] care, P Med Hx, P Surg Hx, Fam Hx , Meds and Allergies all reviewed      Assessment/Plan   Diagnoses and all orders for this visit:  Well adult exam  -     Comprehensive metabolic panel; Future  -     Lipid Panel; Future  Encounter for screening mammogram for malignant neoplasm of breast  -     BI mammo bilateral screening tomosynthesis; Future  Postmenopausal  -     XR DEXA bone density; Future  Tinnitus of both ears  -     Referral to ENT; Future  Class 2 obesity without serious comorbidity with body mass index (BMI) of 35.0 to 35.9 in adult, unspecified obesity type  -     Referral to Clinical Pharmacy; Future    Discussed continued healthy diet and regular exercise  Continue to follow with integrative medicine    Follow up pending lab results    Tessa Goodman, DO

## 2024-02-09 ENCOUNTER — ALLIED HEALTH (OUTPATIENT)
Dept: INTEGRATIVE MEDICINE | Facility: CLINIC | Age: 59
End: 2024-02-09
Payer: COMMERCIAL

## 2024-02-09 ENCOUNTER — LAB (OUTPATIENT)
Dept: LAB | Facility: LAB | Age: 59
End: 2024-02-09
Payer: COMMERCIAL

## 2024-02-09 DIAGNOSIS — E55.9 VITAMIN D DEFICIENCY: ICD-10-CM

## 2024-02-09 DIAGNOSIS — Z00.00 WELL ADULT EXAM: ICD-10-CM

## 2024-02-09 DIAGNOSIS — R73.03 PREDIABETES: Primary | ICD-10-CM

## 2024-02-09 DIAGNOSIS — Z71.3 NUTRITIONAL COUNSELING: ICD-10-CM

## 2024-02-09 DIAGNOSIS — K21.9 GASTROESOPHAGEAL REFLUX DISEASE WITHOUT ESOPHAGITIS: ICD-10-CM

## 2024-02-09 DIAGNOSIS — Z71.89 ENCOUNTER FOR HERB AND VITAMIN SUPPLEMENT MANAGEMENT: ICD-10-CM

## 2024-02-09 LAB
CHOLEST SERPL-MCNC: 154 MG/DL (ref 0–199)
CHOLESTEROL/HDL RATIO: 3
HDLC SERPL-MCNC: 51.4 MG/DL
LDLC SERPL CALC-MCNC: 82 MG/DL
NON HDL CHOLESTEROL: 103 MG/DL (ref 0–149)
TRIGL SERPL-MCNC: 101 MG/DL (ref 0–149)
VLDL: 20 MG/DL (ref 0–40)

## 2024-02-09 PROCEDURE — 80061 LIPID PANEL: CPT

## 2024-02-09 PROCEDURE — 99214 OFFICE O/P EST MOD 30 MIN: CPT | Performed by: PHYSICIAN ASSISTANT

## 2024-02-09 PROCEDURE — 36415 COLL VENOUS BLD VENIPUNCTURE: CPT

## 2024-02-09 NOTE — PROGRESS NOTES
58 y/o female with PMH chronic neck and LBP, endometrial cancer, GERD, vitamin D deficiency presents for follow up. 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 Lives with  (retired), two daughters (fiorella and Maura), and two dogs. Social Support- family.      Successes:  - Exercise-  [facility]- 2-3x/week; gym in evening; feeling stronger; bike, sauna, strength training  - Walking dogs weekly  - Sleeping earlier  - Less ETOH   - Hydration- Velazco Pelligrino with Simona, water at gym   - stopped Diet. Dr Pepper!  - Great annual review, received a promotion!  - Stress has lessened; looking forward to having a good year    Challenges:  - Busy   - Nutrition- still needs attention  - Started Leaky Gut Revive-- prefers capsule; has not taken it consistently to notice a difference    Sleep:   Would like to stop diphenhydramine 50 mg nightly. She has been taking this for several years. Tried to skip a dose over weekend, resulted in insomnia.   Natural supplements only work temporarily.  Recommend to discuss pharmaceutical with PCP (Trazadone?)- message sent for care coordination     Reviewed labs:  Increase Vitamin D3 to 4000 U daily, repeat in 2 months  Begin B12 sublingual , 1000 mcg daily in AM       Congratulated on successes with behavioral change!  Next step is focus on nutrition-- goal of eliminating processed/packaged foods due to plastic exposure which are known endocrine disruptors. Review nutrition notes from previous visit.           Assessment/Plan: (supps sent via Fullscript)  DGL Plus with L-glutamine   Supplement changes- D3, B12  Sleep aid?  Swap out bread for lettuce wraps            ROS:  Constitutional: afebrile  Respiratory: no shortness of breath  Cardiovascular: no chest  pain  Abdominal: no abdominal pain, no n/v/d  Musculoskeletal: no joint pain or swelling   Skin: no rash    Physical Exam:  General: alert and oriented; well-developed, well-nourished, no acute distress  HEENT:  normocephalic, atraumatic, good eye contact  Respiratory: no labored breathing, no conversational dyspnea  Musculoskeletal: moving all extremities appropriately  Neurology: normal gait  Psych: pleasant affect, cooperative

## 2024-02-12 NOTE — PATIENT INSTRUCTIONS
Keep up the good work with exercise, sleep optimization, and hydration!  Follow up with Dr. Goodman regarding a sleep aid  Nutrition:  Continue to work on meal prepping for breakfast. Review previous notes.  Goal to reduce/eliminate exposure of plastics, inflammatory oils, artificial dyes and sweeteners from processed and packaged foods.  Swap out bread for lettuce wraps a few times per week.   Add sandwich contents over a bed of greens, beans, or grains.  Supplements:  Pure Encapsulations DGL Plus  Pawan L-glutamine 500 mg three times daily  Discontinue Leaky Gut Revive powder and begin above capsule regimen. Recommend to use x 1 month, then as needed for heartburn, bloating, etc.   Vitamin D3 4000 IU daily with food  B12 1000 mcg daily in the morning

## 2024-02-15 ENCOUNTER — TELEMEDICINE (OUTPATIENT)
Dept: PHARMACY | Facility: HOSPITAL | Age: 59
End: 2024-02-15
Payer: COMMERCIAL

## 2024-02-15 DIAGNOSIS — E66.9 OBESITY WITH BODY MASS INDEX (BMI) OF 35.0 TO 39.9 WITHOUT COMORBIDITY: Primary | ICD-10-CM

## 2024-02-15 DIAGNOSIS — E66.9 CLASS 2 OBESITY WITHOUT SERIOUS COMORBIDITY WITH BODY MASS INDEX (BMI) OF 35.0 TO 35.9 IN ADULT, UNSPECIFIED OBESITY TYPE: ICD-10-CM

## 2024-02-15 DIAGNOSIS — R73.9 ELEVATED BLOOD SUGAR: ICD-10-CM

## 2024-02-15 RX ORDER — METFORMIN HYDROCHLORIDE 500 MG/1
TABLET, EXTENDED RELEASE ORAL
Qty: 120 TABLET | Refills: 1 | Status: SHIPPED | OUTPATIENT
Start: 2024-02-15 | End: 2024-04-12

## 2024-02-15 RX ORDER — VIT C/E/ZN/COPPR/LUTEIN/ZEAXAN 250MG-90MG
100 CAPSULE ORAL DAILY
COMMUNITY

## 2024-02-15 NOTE — ASSESSMENT & PLAN NOTE
BMI: 35.9  Desired weight loss: 75 lbs  Weight has been stable x 3 years.     Discussed both pharmacologic and non-pharmacologic weight loss options. Including mechanism of action, side effects, efficacy, and cost.     Ran test claims on patients insurance for Zepbound- cost would be ~$1200/month.    Ms. Varner would prefer to start with metformin as an option to help with weight loss, blood sugar/insulin sensitivity, and for its potential benefits related to cancer.     In addition to medication she plans to:   Consider alternatives to zone bars  Identified high fiber muffins or avocado toast.   Does not enjoy cooking, but will consider some food prep.   Focus on nutrient dense lunch options (such as soup, salad, etc.) in place of current sourdough bread.   Reduce screen time/remove social media apps from cell phone/not adding them to new phone  Considering avoidance of alcohol.   Reduce diphenhydramine use by 50% weekly, until fully discontinued.     START  Metformin 500mg XL   Start with 1 tablet nightly at bedtime, after 1 week increase to two tablets daily (1 with morning meal, 1 at bedtime), continue increasing by 1 additional tablet weekly as tolerated to a goal dose of 2 tablets twice daily.

## 2024-02-15 NOTE — PROGRESS NOTES
"  Patient ID: Candi Varner is a 58 y.o. female who presents for Weight Loss.    Referring Provider: Tessa Goodman,      Pt was referred for weight loss options   Last visit with PCP: 2/8/24    In addition to primary care:   Does patient see endocrine? 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   Has meetings all day for work.   5-6pm  makes dinner. Protein + Vegetables.    Meatloaf + green beans last night   Friday/Saturday food delivered in by [employer]   Occasionally Diet Coke, when she feels she is tired/needs energy  7pm [facility] 3-4x/week  150 minutes/week    Weights/Core strength- 10-12 machines, then bike w/ arms that move too (30 mins), infrared sauna (15 mins)  Evenings spent on phone/tablet prior to bedtime.   Would like to transition to reading a book instead of screens.   Barrier, gets sucked in on social media. Prior work was on social media, she no longer needs to be on social, but feels addicted.   Recommended deleting social media off of her phone.   She has a new phone and plans to not add social media.   Goal to stop screen time with sun set  Get sunlight in the eyes for at least 10-15 minutes each morning before work.   Her work set up currently has a window behind her, considering rearranging so that the window is in front of her.   Bed 11:30pm  Sleep: \"Not great\"   Takes 50 mg diphenhydramine nightly, started years ago, premenopausal.   Falls asleep within 20 minutes, but sleeps 2-3 hours, then wakes x 1-2 hours, then wants to sleep but the alarm goes off.   Feels groggy when waking  Has started not taking it on the weekends, past 4 weekends.   Able to sleep in later (10 " am)  Denies waking due to urination, mind racing, or night sweats.   Room temperature 68-70.   Recommend cooler if possible.       Alcohol/Tobacco/Other Drugs:   2-3 glasses of wine/week  Recommend avoidance.    Goal to loose 75 pounds.    Current 185-190 lbs.   Gradually started to gain in 40's  Lost 50 lbs with starvation diet (early 50's), after stopping gradually came back on.   Last 115 lbs in mid-30's   Same weight x3 years, not gaining, not losing.     Recent scare:   Uterine cancer, had surgery (last October), no chemo      Preferred Pharmacy:    [facility]/pharmacy #[id number] - [place] [id number]  Phone: [phone] Fax: [phone]      Adherence/Organization:  Do you have copays on your medications? yes  Do you have trouble affording your current medications? no    HPI    Objective     There were no vitals taken for this visit.     Labs  Lab Results   Component Value Date    BILITOT 0.6 02/08/2024    CALCIUM 9.3 02/08/2024    CO2 29 02/08/2024     02/08/2024    CREATININE 0.79 02/08/2024    GLUCOSE 96 02/08/2024    ALKPHOS 58 02/08/2024    K 4.1 02/08/2024    PROT 7.3 02/08/2024     02/08/2024    AST 23 02/08/2024    ALT 23 02/08/2024    BUN 9 02/08/2024    ANIONGAP 11 02/08/2024    MG 2.18 10/17/2023    ALBUMIN 4.3 02/08/2024    GFRF 72 01/24/2023     Lab Results   Component Value Date    TRIG 101 02/09/2024    CHOL 154 02/09/2024    LDLCALC 82 02/09/2024    HDL 51.4 02/09/2024     Lab Results   Component Value Date    HGBA1C 5.8 (H) 02/08/2024    HGBA1C 5.8 (A) 01/24/2023    HGBA1C 5.8 (A) 11/04/2021     The 10-year ASCVD risk score (Tripp THOMPSON, et al., 2019) is: 2.4%    Values used to calculate the score:      Age: 58 years      Sex: Female      Is Non- : No      Diabetic: No      Tobacco smoker: No      Systolic Blood Pressure: 118 mmHg      Is BP treated: No      HDL Cholesterol: 51.4 mg/dL       Total Cholesterol: 154 mg/dL  Lab Results   Component Value Date    VITD25 27 (L) 02/08/2024       Current Outpatient Medications on File Prior to Visit   Medication Sig Dispense Refill    multivit-min/ferrous fumarate (MULTI VITAMIN ORAL) Take by mouth.      omeprazole (PriLOSEC) 20 mg DR capsule Take 1 capsule (20 mg) by mouth once daily in the morning. Take before meals.      cholecalciferol (Vitamin D-3) 25 MCG (1000 UT) capsule Take 4 capsules (100 mcg) by mouth once daily. 4000 international units daily       No current facility-administered medications on file prior to visit.      Medication and allergy reconciliation completed     Drug Interactions   No significant drug interactions identified    Assessment/Plan   Problem List Items Addressed This Visit             ICD-10-CM    Elevated blood sugar R73.9    Relevant Medications    metFORMIN XR (Glucophage-XR) 500 mg 24 hr tablet    Obesity with body mass index (BMI) of 35.0 to 39.9 without comorbidity - Primary E66.9     BMI: 35.9  Desired weight loss: 75 lbs  Weight has been stable x 3 years.     Discussed both pharmacologic and non-pharmacologic weight loss options. Including mechanism of action, side effects, efficacy, and cost.     Ran test claims on patients insurance for Zepbound- cost would be ~$1200/month.    Ms. Varner would prefer to start with metformin as an option to help with weight loss, blood sugar/insulin sensitivity, and for its potential benefits related to cancer.     In addition to medication she plans to:   Consider alternatives to zone bars  Identified high fiber muffins or avocado toast.   Does not enjoy cooking, but will consider some food prep.   Focus on nutrient dense lunch options (such as soup, salad, etc.) in place of current sourdough bread.   Reduce screen time/remove social media apps from cell phone/not adding them to new phone  Considering avoidance of alcohol.   Reduce diphenhydramine use by 50% weekly, until fully  discontinued.     START  Metformin 500mg XL   Start with 1 tablet nightly at bedtime, after 1 week increase to two tablets daily (1 with morning meal, 1 at bedtime), continue increasing by 1 additional tablet weekly as tolerated to a goal dose of 2 tablets twice daily.          Relevant Medications    metFORMIN XR (Glucophage-XR) 500 mg 24 hr tablet     Other Visit Diagnoses         Codes    Class 2 obesity without serious comorbidity with body mass index (BMI) of 35.0 to 35.9 in adult, unspecified obesity type     E66.9, Z68.35          GERD  Tapering off of omeprazole, utilizing DGL to help with discontinuation.   Omeprazole started last year    Vitamin D3  Recently increased from 2000 to 4000 international units daily.    Currently taking before bed without food.    Recommended taking with largest fat containing meal of the day.        Follow-up: 02/29/24 10 am     Time spent with pt: Total length of time 75 (minutes) of the encounter and more than 50% was spent counseling the patient.      Vanesa Schultz, Pharm.D, [facility], [facility]  Clinical Pharmacist  Pharmacy Services  [phone]    Continue all meds under the continuation of care with the referring provider and clinical pharmacy team.    Verbal consent to manage patient's drug therapy was obtained from the patient and/or an individual authorized to act on behalf of a patient. They were informed they may decline to participate or withdraw from participation in pharmacy services at any time.

## 2024-02-22 ENCOUNTER — HOSPITAL ENCOUNTER (OUTPATIENT)
Dept: RADIOLOGY | Facility: CLINIC | Age: 59
Discharge: HOME | End: 2024-02-22
Payer: COMMERCIAL

## 2024-02-22 DIAGNOSIS — Z12.31 ENCOUNTER FOR SCREENING MAMMOGRAM FOR MALIGNANT NEOPLASM OF BREAST: ICD-10-CM

## 2024-02-22 DIAGNOSIS — Z78.0 POSTMENOPAUSAL: ICD-10-CM

## 2024-02-22 PROCEDURE — 77063 BREAST TOMOSYNTHESIS BI: CPT | Performed by: RADIOLOGY

## 2024-02-22 PROCEDURE — 77067 SCR MAMMO BI INCL CAD: CPT

## 2024-02-22 PROCEDURE — 77080 DXA BONE DENSITY AXIAL: CPT

## 2024-02-22 PROCEDURE — 77080 DXA BONE DENSITY AXIAL: CPT | Performed by: RADIOLOGY

## 2024-02-22 PROCEDURE — 77067 SCR MAMMO BI INCL CAD: CPT | Performed by: RADIOLOGY

## 2024-02-28 NOTE — ASSESSMENT & PLAN NOTE
"BMI: 35.9  Desired weight loss: 75 lbs  Weight has been stable x 3 years.     Started metformin 3 weeks ago, yesterday increased to 3 tablets daily.   Tolerating well. Notes decreased appetite and GI symptoms.   Cut back on Revive supplement and GI symptoms improved.   Eating less, hasn't wanted to snack.   Consider alternatives to zone bars (still having occasionally to use up her current supply)  Has tried eggs with one piece of toast, avocado toast  Does not enjoy cooking, but will consider some food prep.    does most of the cooking  Focus on nutrient dense lunch options (such as soup, salad, etc.) in place of current sourdough bread.   Improved, has not had bread only, more left overs from dinner. Taking time to prep.     [facility] center twice last week  Goal 3-4 times per week    Sleep is \"not great\"   Has not taken any diphenhydramine for 3 weeks, celebrated!  Last night had 5 hours of sleep, improvement  Her goal is to work up to 7 hours nightly  Recommended 600 mg of Ashwaghanda at bedtime  Sent via Fullscript  Discussed avoiding alcohol in the evening.   Sleep is \"most important right now\"   Reduce screen time/remove social media apps from cell phone/not adding them to new phone  Hasn't done this yet. Has decreased use. Not using before bed (phone away by 10 pm).   New phone provides cumulative hours on social media.   Currently 90 min/day, goal 30 minutes daily  Considering setting limits using tech on the phone to limit use.     CONTINUE  Metformin 500mg XL   Continue increasing by 1 additional tablet weekly as tolerated to a goal dose of 2 tablets twice daily.   "

## 2024-02-28 NOTE — PROGRESS NOTES
"  Patient ID: Candi Varner is a 58 y.o. female who presents for Weight Loss (Weight loss)    Referring Provider: Tessa Goodman,      Pt was referred for weight loss options   Last visit with PCP: 2/8/24    In addition to primary care:   Does patient see endocrine? 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   Has meetings all day for work.   5-6pm  makes dinner. Protein + Vegetables.    Meatloaf + green beans last night   Friday/Saturday food delivered in by [employer]   Occasionally Diet Coke, when she feels she is tired/needs energy  7pm [facility] 3-4x/week  150 minutes/week    Weights/Core strength- 10-12 machines, then bike w/ arms that move too (30 mins), infrared sauna (15 mins)  Evenings spent on phone/tablet prior to bedtime.   Would like to transition to reading a book instead of screens.   Barrier, gets sucked in on social media. Prior work was on social media, she no longer needs to be on social, but feels addicted.   Recommended deleting social media off of her phone.   She has a new phone and plans to not add social media.   Goal to stop screen time with sun set  Get sunlight in the eyes for at least 10-15 minutes each morning before work.   Her work set up currently has a window behind her, considering rearranging so that the window is in front of her.   Bed 11:30pm  Sleep: \"Not great\"   Takes 50 mg diphenhydramine nightly, started years ago, premenopausal.   Falls asleep within 20 minutes, but sleeps 2-3 hours, then wakes x 1-2 hours, then wants to sleep but the alarm goes off.   Feels groggy when waking  Has started not taking it on the weekends, past 4 weekends.   Able to sleep in " later (10 am)  Denies waking due to urination, mind racing, or night sweats.   Room temperature 68-70.   Recommend cooler if possible.       Alcohol/Tobacco/Other Drugs:   2-3 glasses of wine/week  Recommend avoidance.    Goal to loose 75 pounds.    Current 185-190 lbs.   Gradually started to gain in 40's  Lost 50 lbs with starvation diet (early 50's), after stopping gradually came back on.   Last 115 lbs in mid-30's   Same weight x3 years, not gaining, not losing.     Recent scare:   Uterine cancer, had surgery (last October), no chemo      Preferred Pharmacy:    [facility]/pharmacy #[id number] - [place], [place] - [address] [place], [place] [address]  Phone: [phone] Fax: [phone]      Adherence/Organization:  Do you have copays on your medications? yes  Do you have trouble affording your current medications? no    HPI    Objective     There were no vitals taken for this visit.     Labs  Lab Results   Component Value Date    BILITOT 0.6 02/08/2024    CALCIUM 9.3 02/08/2024    CO2 29 02/08/2024     02/08/2024    CREATININE 0.79 02/08/2024    GLUCOSE 96 02/08/2024    ALKPHOS 58 02/08/2024    K 4.1 02/08/2024    PROT 7.3 02/08/2024     02/08/2024    AST 23 02/08/2024    ALT 23 02/08/2024    BUN 9 02/08/2024    ANIONGAP 11 02/08/2024    MG 2.18 10/17/2023    ALBUMIN 4.3 02/08/2024    GFRF 72 01/24/2023     Lab Results   Component Value Date    TRIG 101 02/09/2024    CHOL 154 02/09/2024    LDLCALC 82 02/09/2024    HDL 51.4 02/09/2024     Lab Results   Component Value Date    HGBA1C 5.8 (H) 02/08/2024    HGBA1C 5.8 (A) 01/24/2023    HGBA1C 5.8 (A) 11/04/2021     The 10-year ASCVD risk score (Tripp THOMPSON, et al., 2019) is: 2.4%    Values used to calculate the score:      Age: 58 years      Sex: Female      Is Non- : No      Diabetic: No      Tobacco smoker: No      Systolic Blood Pressure: 118 mmHg      Is BP treated: No      HDL Cholesterol:  51.4 mg/dL      Total Cholesterol: 154 mg/dL  Lab Results   Component Value Date    VITD25 27 (L) 02/08/2024       Current Outpatient Medications on File Prior to Visit   Medication Sig Dispense Refill    cholecalciferol (Vitamin D-3) 25 MCG (1000 UT) capsule Take 4 capsules (100 mcg) by mouth once daily. 4000 international units daily      metFORMIN XR (Glucophage-XR) 500 mg 24 hr tablet Start with 1 tablet nightly at bedtime. After 1 week increase to 1 tablet with breakfast and 1 tablet at bedtime. Increase by 1 tablet weekly to a goal of 2 tablets twice daily. Do not crush or split. 120 tablet 1    multivit-min/ferrous fumarate (MULTI VITAMIN ORAL) Take by mouth.      omeprazole (PriLOSEC) 20 mg DR capsule Take 1 capsule (20 mg) by mouth once daily in the morning. Take before meals.       No current facility-administered medications on file prior to visit.      Medication and allergy reconciliation completed     Drug Interactions   No significant drug interactions identified    Assessment/Plan   Problem List Items Addressed This Visit             ICD-10-CM    Obesity with body mass index (BMI) of 35.0 to 39.9 without comorbidity - Primary E66.9     BMI: 35.9  Desired weight loss: 75 lbs  Weight has been stable x 3 years.     Started metformin 3 weeks ago, yesterday increased to 3 tablets daily.   Tolerating well. Notes decreased appetite and GI symptoms.   Cut back on Revive supplement and GI symptoms improved.   Eating less, hasn't wanted to snack.   Consider alternatives to zone bars (still having occasionally to use up her current supply)  Has tried eggs with one piece of toast, avocado toast  Does not enjoy cooking, but will consider some food prep.    does most of the cooking  Focus on nutrient dense lunch options (such as soup, salad, etc.) in place of current sourdough bread.   Improved, has not had bread only, more left overs from dinner. Taking time to prep.     Wellness center twice last  "week  Goal 3-4 times per week    Sleep is \"not great\"   Has not taken any diphenhydramine for 3 weeks, celebrated!  Last night had 5 hours of sleep, improvement  Her goal is to work up to 7 hours nightly  Recommended 600 mg of Ashwaghanda at bedtime  Sent via Fullscript  Discussed avoiding alcohol in the evening.   Sleep is \"most important right now\"   Reduce screen time/remove social media apps from cell phone/not adding them to new phone  Hasn't done this yet. Has decreased use. Not using before bed (phone away by 10 pm).   New phone provides cumulative hours on social media.   Currently 90 min/day, goal 30 minutes daily  Considering setting limits using tech on the phone to limit use.     CONTINUE  Metformin 500mg XL   Continue increasing by 1 additional tablet weekly as tolerated to a goal dose of 2 tablets twice daily.         GERD  Tapering off of omeprazole, utilizing DGL to help with discontinuation.   Omeprazole started last year    Vitamin D3  Recently increased from 2000 to 4000 international units daily.    Currently taking before bed without food.    Recommended taking with largest fat containing meal of the day.        Follow-up: 03/28/24 10:30 am     Time spent with pt: Total length of time 30 (minutes) of the encounter and more than 50% was spent counseling the patient.      Vanesa Schultz, Pharm.D, [facility]  Clinical Pharmacist  Pharmacy Services  [phone]    Continue all meds under the continuation of care with the referring provider and clinical pharmacy team.    Verbal consent to manage patient's drug therapy was obtained from the patient and/or an individual authorized to act on behalf of a patient. They were informed they may decline to participate or withdraw from participation in pharmacy services at any time.  "

## 2024-02-29 ENCOUNTER — TELEMEDICINE (OUTPATIENT)
Dept: PHARMACY | Facility: HOSPITAL | Age: 59
End: 2024-02-29
Payer: COMMERCIAL

## 2024-02-29 DIAGNOSIS — E66.9 OBESITY WITH BODY MASS INDEX (BMI) OF 35.0 TO 39.9 WITHOUT COMORBIDITY: Primary | ICD-10-CM

## 2024-03-06 NOTE — PROGRESS NOTES
Patient ID: Candi Varner is a 58 y.o. female.  Referring Physician: No referring provider defined for this encounter.  Primary Care Provider: Tessa Goodman, DO    Subjective    Interval History:    Overall doing well, bowel movements, bladder and appetite are normal, denies abnormal vaginal bleeding or discharge, pain and lower extremity edema. She asked about the cause of her cancer and if she should need HRT and she reports severe depression while on Tramadol because as soon as she stopped she felt much better and would not like to take it again.    She denies fever, chills, chest pain, SOB, nausea, vomiting, diarrhea, constipation, dysuria, or any other concerning signs of symptoms.      PMH: Sciatica       Past Surgical History: Cervical Disc replacement, Transvaginal mesh       Family History: Father diagnosed with unknown Colon cancer @ 84 , Mother with   hyst for unknown gynecological lesion, Brother w/ unknown cervical cancer.   Otherwise denies a history of gyn related cancers including ovarian,   endometrial, breast, pancreas, and GI cancers.       Social History: Denies a history of smoking, alcohol use or recreational drug use.  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  Entered menopause at 55 year old.  She   has used OCP for 10-13 years.  She has  never used HRT.       Allergies: NKDA     Meds: none     Social: denies t/e/i     Review of Systems   All other systems reviewed and are negative.    Objective    BSA: 1.87 meters squared  /84   Pulse 60   Temp 36.7 °C (98.1 °F) (Core)   Resp 16   Wt 82.8 kg (182 lb 8.7 oz)   SpO2 95%   BMI 35.65 kg/m²      Physical Exam  Constitutional:       General: She is not in acute distress.     Appearance: Normal appearance. She is normal weight. She is not toxic-appearing.   HENT:      Head: Normocephalic.      Mouth/Throat:      Mouth: Mucous membranes are moist.      Pharynx: Oropharynx  is clear.   Eyes:      Extraocular Movements: Extraocular movements intact.      Conjunctiva/sclera: Conjunctivae normal.      Pupils: Pupils are equal, round, and reactive to light.   Cardiovascular:      Rate and Rhythm: Normal rate and regular rhythm.      Heart sounds: Normal heart sounds. No murmur heard.     No friction rub. No gallop.   Pulmonary:      Effort: Pulmonary effort is normal.      Breath sounds: Normal breath sounds. No wheezing or rhonchi.   Abdominal:      General: Abdomen is flat. Bowel sounds are normal. There is no distension.      Palpations: Abdomen is soft.      Tenderness: There is no abdominal tenderness.      Comments: Well healing laparoscopic incision sites    Genitourinary:     Comments: Normal external female genitalia without lesions or masses  Speculum exam: Smooth vagina without lesions or masses  Bimanual exam: smooth vagina without lesions or masses, surgically absent uterus, cervix, adnexa  Rectovaginal exam: Smooth rectovaginal septum without lesions or masses    Musculoskeletal:         General: No swelling. Normal range of motion.      Cervical back: Normal range of motion.   Skin:     General: Skin is warm and dry.   Neurological:      General: No focal deficit present.      Mental Status: She is alert and oriented to person, place, and time.   Psychiatric:         Mood and Affect: Mood normal.         Behavior: Behavior normal.       No visits with results within 3 Week(s) from this visit.   Latest known visit with results is:   Lab on 02/09/2024   Component Date Value Ref Range Status    Cholesterol 02/09/2024 154  0 - 199 mg/dL Final          Age      Desirable   Borderline High   High     0-19 Y     0 - 169       170 - 199     >/= 200    20-24 Y     0 - 189       190 - 224     >/= 225         >24 Y     0 - 199       200 - 239     >/= 240   **All ranges are based on fasting samples. Specific   therapeutic targets will vary based on patient-specific   cardiac  risk.    Pediatric guidelines reference:Pediatrics 2011, 128(S5).Adult guidelines reference: NCEP ATPIII Guidelines,MARITA 2001, 258:0256-71    Venipuncture immediately after or during the administration of Metamizole may lead to falsely low results. Testing should be performed immediately prior to Metamizole dosing.    HDL-Cholesterol 02/09/2024 51.4  mg/dL Final      Age       Very Low   Low     Normal    High    0-19 Y    < 35      < 40     40-45     ----  20-24 Y    ----     < 40      >45      ----        >24 Y      ----     < 40     40-60      >60      Cholesterol/HDL Ratio 02/09/2024 3.0   Final      Ref Values  Desirable  < 3.4  High Risk  > 5.0    LDL Calculated 02/09/2024 82  <=99 mg/dL Final                                Near   Borderline      AGE      Desirable  Optimal    High     High     Very High     0-19 Y     0 - 109     ---    110-129   >/= 130     ----    20-24 Y     0 - 119     ---    120-159   >/= 160     ----      >24 Y     0 -  99   100-129  130-159   160-189     >/=190      VLDL 02/09/2024 20  0 - 40 mg/dL Final    Triglycerides 02/09/2024 101  0 - 149 mg/dL Final       Age         Desirable   Borderline High   High     Very High   0 D-90 D    19 - 174         ----         ----        ----  91 D- 9 Y     0 -  74        75 -  99     >/= 100      ----    10-19 Y     0 -  89        90 - 129     >/= 130      ----    20-24 Y     0 - 114       115 - 149     >/= 150      ----         >24 Y     0 - 149       150 - 199    200- 499    >/= 500    Venipuncture immediately after or during the administration of Metamizole may lead to falsely low results. Testing should be performed immediately prior to Metamizole dosing.    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 Plan for Surveillance      Endometrial cancer determined by uterine biopsy (CMS/[facility])   10/30/2023 Initial Diagnosis    Endometrial cancer determined by uterine biopsy (CMS/HCC)       58 y.o. 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        Scribe Attestation  By signing my name below, I, Bella Carreon   attest that this documentation has been prepared under the direction and in the presence of Tonya Martinez MD.     Provider Attestation - Scribe documentation    All medical record entries made by the Scribe were at my direction and personally dictated by me. I have reviewed the chart and agree that the record accurately reflects my personal performance of the history, physical exam, discussion and plan.    Tonya Martinez MD

## 2024-03-07 ENCOUNTER — OFFICE VISIT (OUTPATIENT)
Dept: GYNECOLOGIC ONCOLOGY | Facility: CLINIC | Age: 59
End: 2024-03-07
Payer: COMMERCIAL

## 2024-03-07 VITALS
WEIGHT: 182.54 LBS | DIASTOLIC BLOOD PRESSURE: 84 MMHG | RESPIRATION RATE: 16 BRPM | OXYGEN SATURATION: 95 % | BODY MASS INDEX: 35.65 KG/M2 | SYSTOLIC BLOOD PRESSURE: 134 MMHG | HEART RATE: 60 BPM | TEMPERATURE: 98.1 F

## 2024-03-07 DIAGNOSIS — Z85.42 ENCOUNTER FOR FOLLOW-UP SURVEILLANCE OF UTERINE CANCER: ICD-10-CM

## 2024-03-07 DIAGNOSIS — C54.1 ENDOMETRIAL CANCER DETERMINED BY UTERINE BIOPSY (MULTI): Primary | ICD-10-CM

## 2024-03-07 DIAGNOSIS — Z08 ENCOUNTER FOR FOLLOW-UP SURVEILLANCE OF UTERINE CANCER: ICD-10-CM

## 2024-03-07 PROCEDURE — 3008F BODY MASS INDEX DOCD: CPT | Performed by: STUDENT IN AN ORGANIZED HEALTH CARE EDUCATION/TRAINING PROGRAM

## 2024-03-07 PROCEDURE — 1036F TOBACCO NON-USER: CPT | Performed by: STUDENT IN AN ORGANIZED HEALTH CARE EDUCATION/TRAINING PROGRAM

## 2024-03-07 PROCEDURE — 99214 OFFICE O/P EST MOD 30 MIN: CPT | Performed by: STUDENT IN AN ORGANIZED HEALTH CARE EDUCATION/TRAINING PROGRAM

## 2024-03-07 ASSESSMENT — ENCOUNTER SYMPTOMS
OCCASIONAL FEELINGS OF UNSTEADINESS: 0
DEPRESSION: 0
LOSS OF SENSATION IN FEET: 0

## 2024-03-07 ASSESSMENT — PAIN SCALES - GENERAL: PAINLEVEL: 0-NO PAIN

## 2024-03-21 NOTE — PROGRESS NOTES
ENT Outpatient Consultation    Chief Complaint: tinnitus, bilateral  History Of Present Illness  Candi Varner is a 58 y.o. female presents for evaluation of bilateral tinnitus.  It is nonpulsatile and symmetric.  She had not previously had an audiogram however we are able to coordinate same-day audiogram.  This showed mild high-frequency hearing loss but normal recognition score.  There is no significant asymmetry.  Tympanograms are normal    She has a history of endometrial cancer that was treated in fall of last year     Past Medical History  She has a past medical history of Encounter for screening for malignant neoplasm of vagina, Other conditions influencing health status, and Personal history of other medical treatment.    Surgical History  She has a past surgical history that includes Other surgical history (11/25/2019); Other surgical history (11/25/2019); and Other surgical history (10/14/2021).     Social History  She reports that she has never smoked. She has never been exposed to tobacco smoke. She has never used smokeless tobacco. She reports that she does not drink alcohol and does not use drugs.    Family History  Family History   Problem Relation Name Age of Onset    Diabetes Mother      Diabetes Father      Colon cancer Father          Allergies  Tramadol     Physical Exam:  CONSTITUTIONAL:  No acute distress  VOICE:  No hoarseness or other abnormality  RESPIRATION:  Breathing comfortably, no stridor  CV:  No clubbing/cyanosis/edema in hands  EYES:  EOM intact, sclera normal  NEURO:  Alert and oriented times 3, Cranial nerves II-XII grossly intact and symmetric bilaterally  HEAD AND FACE:  Symmetric facial features, no masses or lesions, sinuses non-tender to palpation  SALIVARY GLANDS:  Parotid and submandibular glands normal bilaterally  EARS:  Normal external ears, external auditory canals, and TMs to otoscopy, normal hearing to whispered voice.  NOSE:  External nose midline, anterior  "rhinoscopy is normal with limited visualization to the anterior aspect of the interior turbinates, no bleeding or drainage, no lesions  ORAL CAVITY/OROPHARYNX/LIPS:  Normal mucous membranes, normal floor of mouth/tongue/OP, no masses or lesions  PHARYNGEAL WALLS:  No masses or lesions  NECK/LYMPH:  No LAD, no thyroid masses, trachea midline  SKIN:  Neck skin is without scar or injury  PSYCH:  Alert and oriented with appropriate mood and affect     Last Recorded Vitals  Height 1.537 m (5' 0.5\"), weight 83.7 kg (184 lb 8 oz).    Relevant Results      No results found for this or any previous visit (from the past 24 hour(s)).  XR DEXA bone density    Result Date: 2/22/2024  Interpreted By:  Wero Matos, STUDY: DEXA BONE DENSITY2/22/2024 10:50 am   INDICATION: Signs/Symptoms:postmenopausal. The patient is a 57 y/o  year old F.   COMPARISON: None.   ACCESSION NUMBER(S): [id number]   ORDERING CLINICIAN: MOLLY MARIN   TECHNIQUE: DEXA BONE DENSITY   FINDINGS: SPINE L1-L4 Bone Mineral Density: 1.142 T-Score -0.4  Z-Score 0.1 Bone Mineral Density change vs baseline:  Not reported Bone Mineral Density change vs previous: Not reported   LEFT FEMUR -TOTAL Bone Mineral Density: 0.974 T-Score -0.3   Z-Score  0.2 Bone Mineral Density change vs baseline: Not reported Bone Mineral Density change vs previous: Not reported   LEFT FEMUR -NECK Bone Mineral Density: 0.877 T-Score -1.2  Z-Score -0.3 Bone Mineral Density change vs baseline: Not reported Bone Mineral Density change vs previous: Not reported     World Health Organization (WHO) criteria for post-menopausal,  Women: Normal:         T-score at or above -1 SD Osteopenia:   T-score between -1 and -2.5 SD Osteoporosis: T-score at or below -2.5 SD     10-year Fracture Risk: Major Osteoporotic Fracture  6.7 Hip Fracture                        0.4   Note:  If no FRAX score is reported, it is because: Some T-score for Spine Total or Hip Total or Femoral Neck at or below " -2.5   This exam was performed at [facility] on a GE Lunar Prodigy Advance Dexa Unit.       DEXA:  According to World Health Organization criteria, classification is low bone mass (osteopenia)   Followup recommended in two years or sooner as clinically warranted.   All images and detailed analysis are available on the  Radiology PACS.   MACRO: None   Signed by: Wero Matos 2/22/2024 11:21 AM Dictation workstation:   [id number]    BI mammo bilateral screening tomosynthesis    Result Date: 2/22/2024  Interpreted By:  Juan Manuel Moore, STUDY: BI MAMMO BILATERAL SCREENING TOMOSYNTHESIS;  2/22/2024 10:38 am   ACCESSION NUMBER(S): [id number]   ORDERING CLINICIAN: MOLLY MARIN   INDICATION: Screening.   COMPARISON: 01/19/2023   FINDINGS: 2D and tomosynthesis images were reviewed at 1 mm slice thickness.   Density:  The breast tissue is heterogeneously dense, which may obscure small masses.   No suspicious masses or calcifications are identified. There are stable areas of asymmetry bilaterally.   This study was interpreted with CAD.       No mammographic evidence of malignancy.   BI-RADS CATEGORY:   BI-RADS Category:  1 Negative. Recommendation:  Annual Screening. Recommended Date:  1 Year. Laterality:  Bilateral.   For any future breast imaging appointments, please call [phone] ([phone]).     MACRO: None   Signed by: Juan Manuel Moore 2/22/2024 11:06 AM Dictation workstation:   [id number]      Assessment and Plan  58 y.o. female with essentially normal hearing with exception of mild loss at high-frequency.  She has bilateral tinnitus that is nonpulsatile in nature and symmetric.    -Discussed distraction techniques  -Information from up-to-date provided to the patient about different management options.  We discussed there is no cure.  -No indication for imaging based on history and hearing test.    -She will follow-up with me as needed    Grzegorz Saldaña MD

## 2024-03-22 ENCOUNTER — CLINICAL SUPPORT (OUTPATIENT)
Dept: AUDIOLOGY | Facility: CLINIC | Age: 59
End: 2024-03-22
Payer: COMMERCIAL

## 2024-03-22 ENCOUNTER — OFFICE VISIT (OUTPATIENT)
Dept: OTOLARYNGOLOGY | Facility: CLINIC | Age: 59
End: 2024-03-22
Payer: COMMERCIAL

## 2024-03-22 VITALS — HEIGHT: 61 IN | BODY MASS INDEX: 34.83 KG/M2 | WEIGHT: 184.5 LBS

## 2024-03-22 DIAGNOSIS — H93.13 TINNITUS OF BOTH EARS: Primary | ICD-10-CM

## 2024-03-22 DIAGNOSIS — H93.13 TINNITUS OF BOTH EARS: ICD-10-CM

## 2024-03-22 PROCEDURE — 1036F TOBACCO NON-USER: CPT | Performed by: OTOLARYNGOLOGY

## 2024-03-22 PROCEDURE — 92550 TYMPANOMETRY & REFLEX THRESH: CPT | Performed by: AUDIOLOGIST

## 2024-03-22 PROCEDURE — 92557 COMPREHENSIVE HEARING TEST: CPT | Performed by: AUDIOLOGIST

## 2024-03-22 PROCEDURE — 99203 OFFICE O/P NEW LOW 30 MIN: CPT | Performed by: OTOLARYNGOLOGY

## 2024-03-22 PROCEDURE — 3008F BODY MASS INDEX DOCD: CPT | Performed by: OTOLARYNGOLOGY

## 2024-03-22 ASSESSMENT — PAIN SCALES - GENERAL: PAINLEVEL: 0-NO PAIN

## 2024-03-25 NOTE — PROGRESS NOTES
Name: Candi Varner  YOB: 1965  Age: 58 y.o.    Date of Evaluation:  03/22/2024      Candi Varner, 58 y.o., was seen for a hearing test at the referral of Dr. Saldaña. Patient has a history of bilateral tinnitus that has become more bothersome recently. She reports her family notices concerns for her hearing. She denies vertigo, history of ear infections, and history of ear surgeries.    Audiometric Evaluation:  Otoscopy revealed clear ear canals with visible tympanic membranes, bilaterally.     Tympanometry:  Right Ear: Normal middle ear function with normal ear canal volume, peak pressure, and compliance.   Left Ear: Normal middle ear function with normal ear canal volume, peak pressure, and compliance.     Ipsilateral Acoustic Reflexes:  Right Ear: Present from 500-4000 Hz.  Left Ear: Present from 500-4000 Hz.     Behavioral Hearing Evaluation:  Right Ear: Normal hearing levels from 250-8000 Hz. Excellent word understanding (100%) at 55 dB HL.  Left Ear: Normal hearing levels from 250-8000 Hz. Excellent word understanding (100%) at 55 dB HL.    Speech reception threshold (15 dB HL in the right and 10 dB HL in the left) in agreement with pure tone averages.    Results:  Today's results were discussed with Candi Varner indicating normal hearing sensitivity with normal middle ear function and excellent word understand bilaterally.    Treatment Plan:  1. Follow-up with Dr. Saldaña  2. Retest hearing in conjunction with medical management    Appointment Time: [id number]    Nacho Ortiz CCC-A  Licensed Senior Audiologist

## 2024-03-27 NOTE — ASSESSMENT & PLAN NOTE
"BMI: 35.9  Desired weight loss: 75 lbs  Weight has been stable x 3 years.   Has been actively working with diet and lifestyle change for weight loss since she had cancer, ~9 months ago.     Current 185-190lbs, last appointment 184.   Patient had increased to 4 tablets daily. Having diarrhea 2-3x/day and occasional nausea.   Recommending dose reduction to 3 tablets   Decreased appetite   Not as much as last time we talked.   A little more appetite, intentionally trying not to snack between meals snack.   Patient has stopped zone bars, now trying  joes bars (don't taste as good, but ok).   Has tried eggs with one piece of toast, avocado toast  Does not enjoy cooking, but will consider some food prep.    does most of the cooking  Focus on nutrient dense lunch options (such as soup, salad, etc.)     Wellness center twice last week  Goal 3-4 times per week    Sleep is \"not great\"   Continues to avoid diphenhydramine  Increased from 4.5 to 5 hours nightly to 6 hours nightly this past week, overall improvement  Her goal is to work up to 7 hours nightly  Taking Ashwaghanda at bedtime  Tolerating well, she feels it has been effective for her.   Tried melatonin but did not feel it worked.   Discussed avoiding alcohol in the evening.   Patient has stopped drinking alcohol during the week as well as caffeine past 5pm, recommend slowly moving to no caffeine past noon.   Sleep is \"most important right now\"   Reduce screen time/remove social media apps from cell phone  Has decreased use. Not using before bed (phone away by 10 pm).   Watching TV 10-10:15pm then off.    has a \"better routine\" that does not involve electronics, he likes to read, so she is trying to align with his routine.   New phone provides cumulative hours on social media.   Currently 45 min/day, goal 30 minutes daily  Considering setting limits using tech on the phone to limit use.   Concurrent medical conditions:   Prediabetes "     CONTINUE  Metformin 500mg XL     CONSIDERED  Zepbound 2.5 mg once weekly   Patient has  insurance, currently not covered for weight loss.

## 2024-03-27 NOTE — PROGRESS NOTES
"  Patient ID: Candi Varner is a 58 y.o. female who presents for Weight Loss (Weight loss)    Referring Provider: Tessa Goodman,      Pt was referred for weight loss options   Last visit with PCP: 2/8/24    In addition to primary care:   Does patient see endocrine? 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   Has meetings all day for work.   5-6pm  makes dinner. Protein + Vegetables.    Meatloaf + green beans last night   Friday/Saturday food delivered in by [employer]   Occasionally Diet Coke, when she feels she is tired/needs energy  7pm [facility] 3-4x/week  150 minutes/week    Weights/Core strength- 10-12 machines, then bike w/ arms that move too (30 mins), infrared sauna (15 mins)  Evenings spent on phone/tablet prior to bedtime.   Would like to transition to reading a book instead of screens.   Barrier, gets sucked in on social media. Prior work was on social media, she no longer needs to be on social, but feels addicted.   Recommended deleting social media off of her phone.   She has a new phone and plans to not add social media.   Goal to stop screen time with sun set  Get sunlight in the eyes for at least 10-15 minutes each morning before work.   Her work set up currently has a window behind her, considering rearranging so that the window is in front of her.   Bed 11:30pm  Sleep: \"Not great\"   Takes 50 mg diphenhydramine nightly, started years ago, premenopausal.   Falls asleep within 20 minutes, but sleeps 2-3 hours, then wakes x 1-2 hours, then wants to sleep but the alarm goes off.   Feels groggy when waking  Has started not taking it on the weekends, past 4 weekends.   Able to sleep in " later (10 am)  Denies waking due to urination, mind racing, or night sweats.   Room temperature 68-70.   Recommend cooler if possible.       Alcohol/Tobacco/Other Drugs:   2-3 glasses of wine/week  Recommend avoidance.    Goal to loose 75 pounds.    Current 185-190 lbs.   Gradually started to gain in 40's  Lost 50 lbs with starvation diet (early 50's), after stopping gradually came back on.   Last 115 lbs in mid-30's   Same weight x3 years, not gaining, not losing.     Recent scare:   Uterine cancer, had surgery (last October), no chemo      Preferred Pharmacy:    [facility]/pharmacy #[id number] - [place], [place] - [address] [place] [address]  Phone: [phone] Fax: [phone]    [facility]  [address], [address]  [facility] [address]  Phone: [phone] Fax: [phone]      Adherence/Organization:  Do you have copays on your medications? yes  Do you have trouble affording your current medications? no    HPI    Objective     There were no vitals taken for this visit.     Labs  Lab Results   Component Value Date    BILITOT 0.6 02/08/2024    CALCIUM 9.3 02/08/2024    CO2 29 02/08/2024     02/08/2024    CREATININE 0.79 02/08/2024    GLUCOSE 96 02/08/2024    ALKPHOS 58 02/08/2024    K 4.1 02/08/2024    PROT 7.3 02/08/2024     02/08/2024    AST 23 02/08/2024    ALT 23 02/08/2024    BUN 9 02/08/2024    ANIONGAP 11 02/08/2024    MG 2.18 10/17/2023    ALBUMIN 4.3 02/08/2024    GFRF 72 01/24/2023     Lab Results   Component Value Date    TRIG 101 02/09/2024    CHOL 154 02/09/2024    LDLCALC 82 02/09/2024    HDL 51.4 02/09/2024     Lab Results   Component Value Date    HGBA1C 5.8 (H) 02/08/2024    HGBA1C 5.8 (A) 01/24/2023    HGBA1C 5.8 (A) 11/04/2021     The 10-year ASCVD risk score (Tripp THOMPSON, et al., 2019) is: 3.1%    Values used to calculate the score:      Age: 58 years      Sex: Female      Is Non- : No       Diabetic: No      Tobacco smoker: No      Systolic Blood Pressure: 134 mmHg      Is BP treated: No      HDL Cholesterol: 51.4 mg/dL      Total Cholesterol: 154 mg/dL  Lab Results   Component Value Date    VITD25 27 (L) 02/08/2024       Current Outpatient Medications on File Prior to Visit   Medication Sig Dispense Refill    cholecalciferol (Vitamin D-3) 25 MCG (1000 UT) capsule Take 4 capsules (100 mcg) by mouth once daily. 4000 international units daily      metFORMIN XR (Glucophage-XR) 500 mg 24 hr tablet Start with 1 tablet nightly at bedtime. After 1 week increase to 1 tablet with breakfast and 1 tablet at bedtime. Increase by 1 tablet weekly to a goal of 2 tablets twice daily. Do not crush or split. 120 tablet 1    multivit-min/ferrous fumarate (MULTI VITAMIN ORAL) Take by mouth.      omeprazole (PriLOSEC) 20 mg DR capsule Take 1 capsule (20 mg) by mouth once daily in the morning. Take before meals.       No current facility-administered medications on file prior to visit.      Medication and allergy reconciliation completed     Drug Interactions   No significant drug interactions identified    Assessment/Plan   Problem List Items Addressed This Visit             ICD-10-CM    Obesity with body mass index (BMI) of 35.0 to 39.9 without comorbidity - Primary E66.9     BMI: 35.9  Desired weight loss: 75 lbs  Weight has been stable x 3 years.   Has been actively working with diet and lifestyle change for weight loss since she had cancer, ~9 months ago.     Current 185-190lbs, last appointment 184.   Patient had increased to 4 tablets daily. Having diarrhea 2-3x/day and occasional nausea.   Recommending dose reduction to 3 tablets   Decreased appetite   Not as much as last time we talked.   A little more appetite, intentionally trying not to snack between meals snack.   Patient has stopped zone bars, now trying  joes bars (don't taste as good, but ok).   Has tried eggs with one piece of toast, avocado  "toast  Does not enjoy cooking, but will consider some food prep.    does most of the cooking  Focus on nutrient dense lunch options (such as soup, salad, etc.)     Wellness center twice last week  Goal 3-4 times per week    Sleep is \"not great\"   Continues to avoid diphenhydramine  Increased from 4.5 to 5 hours nightly to 6 hours nightly this past week, overall improvement  Her goal is to work up to 7 hours nightly  Taking Ashwaghanda at bedtime  Tolerating well, she feels it has been effective for her.   Tried melatonin but did not feel it worked.   Discussed avoiding alcohol in the evening.   Patient has stopped drinking alcohol during the week as well as caffeine past 5pm, recommend slowly moving to no caffeine past noon.   Sleep is \"most important right now\"   Reduce screen time/remove social media apps from cell phone  Has decreased use. Not using before bed (phone away by 10 pm).   Watching TV 10-10:15pm then off.    has a \"better routine\" that does not involve electronics, he likes to read, so she is trying to align with his routine.   New phone provides cumulative hours on social media.   Currently 45 min/day, goal 30 minutes daily  Considering setting limits using tech on the phone to limit use.   Concurrent medical conditions:   Prediabetes     CONTINUE  Metformin 500mg XL     CONSIDERED  Zepbound 2.5 mg once weekly   Patient has  insurance, currently not covered for weight loss.           GERD  Tapering off of omeprazole, utilizing DGL to help with discontinuation.   Omeprazole started last year    Vitamin D3  Recently increased from 2000 to 4000 international units daily.    Currently taking before bed without food.    Recommended taking with largest fat containing meal of the day.        Follow-up: 06/6/24 11:30 am     Time spent with pt: Total length of time 36 (minutes) of the encounter and more than 50% was spent counseling the patient.      Vanesa Schultz, Pharm.D, [facility], [facility]  Clinical " Pharmacist  Pharmacy Services  [phone]    Continue all meds under the continuation of care with the referring provider and clinical pharmacy team.    Verbal consent to manage patient's drug therapy was obtained from the patient and/or an individual authorized to act on behalf of a patient. They were informed they may decline to participate or withdraw from participation in pharmacy services at any time.

## 2024-03-28 ENCOUNTER — TELEMEDICINE (OUTPATIENT)
Dept: PHARMACY | Facility: HOSPITAL | Age: 59
End: 2024-03-28
Payer: COMMERCIAL

## 2024-03-28 DIAGNOSIS — E66.9 OBESITY WITH BODY MASS INDEX (BMI) OF 35.0 TO 39.9 WITHOUT COMORBIDITY: Primary | ICD-10-CM

## 2024-04-19 ENCOUNTER — ALLIED HEALTH (OUTPATIENT)
Dept: INTEGRATIVE MEDICINE | Facility: CLINIC | Age: 59
End: 2024-04-19
Payer: COMMERCIAL

## 2024-04-19 VITALS — BODY MASS INDEX: 34.19 KG/M2 | WEIGHT: 178 LBS

## 2024-04-19 DIAGNOSIS — R53.83 FATIGUE, UNSPECIFIED TYPE: ICD-10-CM

## 2024-04-19 DIAGNOSIS — Z71.3 NUTRITIONAL COUNSELING: ICD-10-CM

## 2024-04-19 DIAGNOSIS — H93.13 TINNITUS OF BOTH EARS: Primary | ICD-10-CM

## 2024-04-19 PROCEDURE — 99215 OFFICE O/P EST HI 40 MIN: CPT | Performed by: PHYSICIAN ASSISTANT

## 2024-04-19 NOTE — PROGRESS NOTES
Integrative Medicine Consult:  56 y/o female with PMH chronic neck and LBP, endometrial cancer, GERD, vitamin D deficiency presents for follow up. 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 Lives with  (retired), two daughters (Rola and Jossy), and two dogs. Social Support- family.      Successes:  - Started Metformin, ashwagandha, no additional magnesium due to loose stool  - Walking daily   - Sleep- doing better, off OTC since 1/2024; averages 5-6 hours, consistent, but waking up early and unable to go back to sleep (1-2 hours before alarm goes off)  - Diet coke every once in a while   - GI symptoms- imprved in recent weeks, DGL plus, L-glutamine  - Added gingko for tinnitus- no relief yet   - Nutrition- comfortable with food intake right now; less snacking between meals with metformin       Challenges:  - Metformin- could not tolerate 2000 mg due to loose stool, plans to try and increase to 1500 mg-- recommend take with food  - Daughter with health issues- elevated copper, Hashimoto's, weight  - Busy in last few weeks, so she has not been able to go to gym as often (also daughter/gym edwin is traveling)   - Notices she has been holding her breath when scrolling on phone  - Occ difficulty cutting herself off from work  - Tinnitus x 2.5 years- b/l, distractible; constant, irritating, no HL present; this causes fatigue; occurred after covid vs. Vaccine    Meds/Supplements:  Ashwagandha   Gingko biloba   B12  Vitamin D  DGL Plus  L-glutamine  Metformin  Omeprazole    Congratulated on success with behavior changes and weight loss! She will get back into gym routine this week.    No change to sleep supps at this time. If wakes up early, try breath work or 5 senses grounding tool to get back to sleep.     Re: tinnitus-- discussed other potential causes (low B12/D, artificial sweeteners). Encouraged to try 2 week elimination of artificial sweeteners to see if this helps. Also try out Bhramari  Pranayama/Humming bee breath.       Assessment/Plan:  4-7-8 breath in am  Radha christianson  Artifical sweetners  Tinnitus symptom management   SEE PATIENT INSTRUCTION              ROS:  Constitutional: afebrile, +tinnitus  Respiratory: no shortness of breath  Cardiovascular: no chest  pain  Abdominal: no abdominal pain, no n/v/d  Musculoskeletal: no joint pain or swelling   Skin: no rash    Physical Exam:  General: alert and oriented; well-developed, well-nourished, no acute distress  HEENT: normocephalic, atraumatic, good eye contact  Respiratory: no labored breathing, no conversational dyspnea  Musculoskeletal: moving all extremities appropriately  Neurology: normal gait  Psych: pleasant affect, cooperative

## 2024-04-19 NOTE — PATIENT INSTRUCTIONS
Tinnitus:  Drink plenty of filtered water  Consider using a journal to document triggers--i.e. sodium, caffeine, stress, artificial sweeteners  Reduce exposure to:  NSAIDs (ibuprofen, naproxen, advil, etc)  Some prescription drugs like blood pressure, antibiotics, antidepressants, etc.  Use ear protection when exposed to loud noises  Reduce alcohol   Artificial sweeteners- *Leeper with 2-week elimination of artificial sweeteners*  Mind-Body tools to reduce symptoms:  Acupressure for tinnitus- https://www.Path101ube.com/watch?v=rP93oi4Yat1   Bhramari pranayama- humming bee breath- 3-5 minutes, twice daily and PRN  https://www.Path101ube.com/watch?v=tLZ20c5rSs4&t=61s   Medical journal regarding Bhramari and tinnitus: https://www.[employer].Mappyfriends/doi/abs/10.3109/6704889G.2010.337532#:~:text=Bhramari%20Pranayama%20is%20a%20'Yogic,pressing%20of%20the%20closed%20eyelids.   Sleep improvement:  Use mindful breathing in early morning to try and help you fall back asleep  4-7-8 breath- https://www.Path101ube.com/watch?v=c6zoIMW-h6x&t=39s   Resources:  Erythritol- https://www.nature.com/articles/x50309-747-30444-3

## 2024-06-06 ENCOUNTER — TELEMEDICINE (OUTPATIENT)
Dept: PHARMACY | Facility: HOSPITAL | Age: 59
End: 2024-06-06
Payer: COMMERCIAL

## 2024-06-06 DIAGNOSIS — E66.9 OBESITY WITH BODY MASS INDEX (BMI) OF 35.0 TO 39.9 WITHOUT COMORBIDITY: Primary | ICD-10-CM

## 2024-06-06 NOTE — PROGRESS NOTES
"  Patient ID: Candi Varner is a 58 y.o. female who presents for No chief complaint on file. (Weight loss)    Referring Provider: [place]    Pt was referred for weight loss options   Last visit with PCP: 2/8/24    In addition to primary care:   Does patient see endocrine? 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   Has meetings all day for work.   5-6pm  makes dinner. Protein + Vegetables.    Meatloaf + green beans last night   Friday/Saturday food delivered in by [employer]   Occasionally Diet Coke, when she feels she is tired/needs energy  7pm [facility] 3-4x/week  150 minutes/week    Weights/Core strength- 10-12 machines, then bike w/ arms that move too (30 mins), infrared sauna (15 mins)  Evenings spent on phone/tablet prior to bedtime.   Would like to transition to reading a book instead of screens.   Barrier, gets sucked in on social media. Prior work was on social media, she no longer needs to be on social, but feels addicted.   Recommended deleting social media off of her phone.   She has a new phone and plans to not add social media.   Goal to stop screen time with sun set  Get sunlight in the eyes for at least 10-15 minutes each morning before work.   Her work set up currently has a window behind her, considering rearranging so that the window is in front of her.   Bed 11:30pm  Sleep: \"Not great\"   Takes 50 mg diphenhydramine nightly, started years ago, premenopausal.   Falls asleep within 20 minutes, but sleeps 2-3 hours, then wakes x 1-2 hours, then wants to sleep but the alarm goes off.   Feels groggy when waking  Has started not taking it on the weekends, past 4 weekends.   Able to " sleep in later (10 am)  Denies waking due to urination, mind racing, or night sweats.   Room temperature 68-70.   Recommend cooler if possible.       Alcohol/Tobacco/Other Drugs:   2-3 glasses of wine/week  Recommend avoidance.    Goal to loose 75 pounds.    Current 185-190 lbs.   Gradually started to gain in 40's  Lost 50 lbs with starvation diet (early 50's), after stopping gradually came back on.   Last 115 lbs in mid-30's   Same weight x3 years, not gaining, not losing.     Recent scare:   Uterine cancer, had surgery (last October), no chemo      Preferred Pharmacy:    [facility]/pharmacy #[id number] - [place], [place] - [address] [place] [address]  Phone: [phone] Fax: [phone]    [facility]  [address], [address]  [facility] [address]  Phone: [phone] Fax: [phone]      Adherence/Organization:  Do you have copays on your medications? yes  Do you have trouble affording your current medications? no    HPI    Objective     There were no vitals taken for this visit.     Labs  Lab Results   Component Value Date    BILITOT 0.6 02/08/2024    CALCIUM 9.3 02/08/2024    CO2 29 02/08/2024     02/08/2024    CREATININE 0.79 02/08/2024    GLUCOSE 96 02/08/2024    ALKPHOS 58 02/08/2024    K 4.1 02/08/2024    PROT 7.3 02/08/2024     02/08/2024    AST 23 02/08/2024    ALT 23 02/08/2024    BUN 9 02/08/2024    ANIONGAP 11 02/08/2024    MG 2.18 10/17/2023    ALBUMIN 4.3 02/08/2024    GFRF 72 01/24/2023     Lab Results   Component Value Date    TRIG 101 02/09/2024    CHOL 154 02/09/2024    LDLCALC 82 02/09/2024    HDL 51.4 02/09/2024     Lab Results   Component Value Date    HGBA1C 5.8 (H) 02/08/2024    HGBA1C 5.8 (A) 01/24/2023    HGBA1C 5.8 (A) 11/04/2021     The 10-year ASCVD risk score (Tripp THOMPSON, et al., 2019) is: 3.1%    Values used to calculate the score:      Age: 58 years      Sex: Female      Is Non- :  "No      Diabetic: No      Tobacco smoker: No      Systolic Blood Pressure: 134 mmHg      Is BP treated: No      HDL Cholesterol: 51.4 mg/dL      Total Cholesterol: 154 mg/dL  Lab Results   Component Value Date    VITD25 27 (L) 02/08/2024       Current Outpatient Medications on File Prior to Visit   Medication Sig Dispense Refill    cholecalciferol (Vitamin D-3) 25 MCG (1000 UT) capsule Take 4 capsules (100 mcg) by mouth once daily. 4000 international units daily      metFORMIN  mg 24 hr tablet PLEASE SEE ATTACHED FOR DETAILED DIRECTIONS 120 tablet 0    multivit-min/ferrous fumarate (MULTI VITAMIN ORAL) Take by mouth.      omeprazole (PriLOSEC) 20 mg DR capsule Take 1 capsule (20 mg) by mouth once daily in the morning. Take before meals.       No current facility-administered medications on file prior to visit.      Medication and allergy reconciliation completed     Drug Interactions   No significant drug interactions identified    Assessment/Plan   Problem List Items Addressed This Visit             ICD-10-CM    Obesity with body mass index (BMI) of 35.0 to 39.9 without comorbidity - Primary E66.9     BMI: 35.9  Desired weight loss: 75 lbs   -Patient has lost 12 lbs since January  Weight has been stable x 3 years.   Has been actively working with diet and lifestyle change for weight loss since she had cancer, ~9 months ago.     Patient is transitioning into a  position, due to increased work load she has not had time for exercise.   Metformin  \"Going well\", 1000 mg at night before bed and sometimes 500 mg mid afternoon.   Recommended moving mid afternoon dose to 1st meal of the day (10-11am).   Discussed pros/cons of intermittent fasting    Patient is eating small meals, but feels satisfied.     Sleep is \"most important right now\"   Sleep has improved, getting 5-6 hours straight which is an improvement.   Her goal is to work up to 7 hours nightly  Taking Ashwaghanda at bedtime  Tolerating well, " she feels it has been effective for her.   Tried melatonin but did not feel it worked.   Recommended moving multivitamin to dinner, magnesium at bedtime.   Discussed avoiding alcohol in the evening.   Patient has stopped drinking alcohol during the week as well as caffeine past 5pm, recommend slowly moving to no caffeine past noon.   Patient has stopped having caffeine by noon about 60% of the time.   Alcohol only on the weekend, 1-2 glasses of wine/weekend  Recommended avoidance when possible  Patients screen time has increased, she plans to start trying to incorporate some walks at lunch.       Concurrent medical conditions:   Prediabetes     CONTINUE  Metformin 500mg XL     CONSIDERED  Zepbound 2.5 mg once weekly  Patient has  insurance, currently not covered for weight loss.   CGM- will consider in the next few months                GERD  Tapering off of omeprazole, utilizing DGL to help with discontinuation.   Omeprazole started last year    Vitamin D3  Recently increased from 2000 to 4000 international units daily.    Currently taking before bed without food.    Recommended taking with largest fat containing meal of the day.        Follow-up: 09/26/24 11:30 am     Time spent with pt: Total length of time 36 (minutes) of the encounter and more than 50% was spent counseling the patient.      Vanesa Schultz, Pharm.D, [facility], [facility]  Clinical Pharmacist  Pharmacy Services  [phone]    Continue all meds under the continuation of care with the referring provider and clinical pharmacy team.    Verbal consent to manage patient's drug therapy was obtained from the patient and/or an individual authorized to act on behalf of a patient. They were informed they may decline to participate or withdraw from participation in pharmacy services at any time.

## 2024-06-06 NOTE — ASSESSMENT & PLAN NOTE
"BMI: 35.9  Desired weight loss: 75 lbs   -Patient has lost 12 lbs since January  Weight has been stable x 3 years.   Has been actively working with diet and lifestyle change for weight loss since she had cancer, ~9 months ago.     Patient is transitioning into a  position, due to increased work load she has not had time for exercise.   Metformin  \"Going well\", 1000 mg at night before bed and sometimes 500 mg mid afternoon.   Recommended moving mid afternoon dose to 1st meal of the day (10-11am).   Discussed pros/cons of intermittent fasting    Patient is eating small meals, but feels satisfied.     Sleep is \"most important right now\"   Sleep has improved, getting 5-6 hours straight which is an improvement.   Her goal is to work up to 7 hours nightly  Taking Ashwaghanda at bedtime  Tolerating well, she feels it has been effective for her.   Tried melatonin but did not feel it worked.   Recommended moving multivitamin to dinner, magnesium at bedtime.   Discussed avoiding alcohol in the evening.   Patient has stopped drinking alcohol during the week as well as caffeine past 5pm, recommend slowly moving to no caffeine past noon.   Patient has stopped having caffeine by noon about 60% of the time.   Alcohol only on the weekend, 1-2 glasses of wine/weekend  Recommended avoidance when possible  Patients screen time has increased, she plans to start trying to incorporate some walks at lunch.       Concurrent medical conditions:   Prediabetes     CONTINUE  Metformin 500mg XL     CONSIDERED  Zepbound 2.5 mg once weekly  Patient has  insurance, currently not covered for weight loss.   CGM- will consider in the next few months      "

## 2024-06-18 ENCOUNTER — APPOINTMENT (OUTPATIENT)
Dept: INTEGRATIVE MEDICINE | Facility: CLINIC | Age: 59
End: 2024-06-18
Payer: COMMERCIAL

## 2024-06-18 DIAGNOSIS — Z71.89 ENCOUNTER FOR HERB AND VITAMIN SUPPLEMENT MANAGEMENT: ICD-10-CM

## 2024-06-18 DIAGNOSIS — Z00.00 HEALTHCARE MAINTENANCE: Primary | ICD-10-CM

## 2024-06-18 PROCEDURE — 99214 OFFICE O/P EST MOD 30 MIN: CPT | Performed by: PHYSICIAN ASSISTANT

## 2024-06-18 NOTE — PROGRESS NOTES
Integrative Medicine Consult:  58 y/o female with PMH chronic neck and LBP, endometrial cancer, GERD, vitamin D deficiency presents for follow up. 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 Lives with  (retired), two daughters (Rola and Jossy), and two dogs. Social Support- family.        Successes:  - Doing well  - Sleep- improving, cannot make it past 6 hours  - Bms have regulated, some urgency  - [facility] majority of week  - Physical activity through [employer]   - Daughter going to grad school next year       Challenges:  - wants to be able to sleep longer  - needs to find new PCP     Meds/Supplements:  Ashwagandha   Magnesium  Metformin- unsure what type      Congratulated on successes with lifestyle changes!  Will renew Metformin Rx as patient is between finding a PCP  Discussed PFPT and indications for this.   Candi questioned long-term use of ashwagandha.  Discussed limitations given no long-term data, although this is common with pharmaceuticals.  Can take few months off every year. Discontinue if iron levels are high.         Assessment/Plan:  Mag glycinate- fullscript   New PCP   Metformin   PFPT   SEE PATIENT INSTRUCTIONS      ROS:  Constitutional: afebrile  Respiratory: no shortness of breath  Cardiovascular: no chest  pain  Abdominal: no abdominal pain, no n/v/d  Musculoskeletal: no joint pain or swelling   Skin: no rash    Physical Exam:  General: alert and oriented; well-developed, well-nourished, no acute distress  HEENT: normocephalic, atraumatic, good eye contact  Respiratory: no labored breathing, no conversational dyspnea  Musculoskeletal: moving all extremities appropriately  Neurology: normal gait  Psych: pleasant affect, cooperative

## 2024-06-19 NOTE — PATIENT INSTRUCTIONS
Keep up the good work with sleep optimization, exercise, and spending time in nature!  Call to establish care with new primary care provider   Shruthiap to magnesium glcyinate (sent on Fullscript)  Consider pelvic floor physical therapy in the future for bowel/bladder support

## 2024-07-10 NOTE — PROGRESS NOTES
Patient ID: Candi Varner is a 58 y.o. female.  Referring Physician: No referring provider defined for this encounter.  Primary Care Provider: Tessa Goodman DO    Subjective    Interval History:  She doesn't have any major concerns today, bowel movements, bladder and appetite are normal, denies abnormal vaginal bleeding or discharge lower extremity edema and pain and she is excited to travel in September to [place].    She denies fever, chills, chest pain, SOB, nausea, vomiting, diarrhea, constipation, dysuria, or any other concerning signs of symptoms.      PMH: Sciatica       Past Surgical History: Cervical Disc replacement, Transvaginal mesh       Family History: Father diagnosed with unknown Colon cancer @ 84 , Mother with   hyst for unknown gynecological lesion, Brother w/ unknown cervical cancer.   Otherwise denies a history of gyn related cancers including ovarian,   endometrial, breast, pancreas, and GI cancers.       Social History: Denies a history of smoking, alcohol use or recreational drug use.  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  Entered menopause at 55 year old.  She   has used OCP for 10-13 years.  She has  never used HRT.       Allergies: NKDA     Meds: none     Social: denies t/e/i     Review of Systems   All other systems reviewed and are negative.    Objective    BSA: 1.86 meters squared  /73   Pulse 61   Temp 35.6 °C (96.1 °F)   Resp 18   Wt 81.1 kg (178 lb 14.5 oz)   SpO2 96%   BMI 34.36 kg/m²      Physical Exam  Constitutional:       General: She is not in acute distress.     Appearance: Normal appearance. She is normal weight. She is not toxic-appearing.   HENT:      Head: Normocephalic.      Mouth/Throat:      Mouth: Mucous membranes are moist.      Pharynx: Oropharynx is clear.   Eyes:      Extraocular Movements: Extraocular movements intact.      Conjunctiva/sclera: Conjunctivae normal.       Pupils: Pupils are equal, round, and reactive to light.   Cardiovascular:      Rate and Rhythm: Normal rate and regular rhythm.      Heart sounds: Normal heart sounds. No murmur heard.     No friction rub. No gallop.   Pulmonary:      Effort: Pulmonary effort is normal.      Breath sounds: Normal breath sounds. No wheezing or rhonchi.   Abdominal:      General: Abdomen is flat. Bowel sounds are normal. There is no distension.      Palpations: Abdomen is soft.      Tenderness: There is no abdominal tenderness.      Comments: Well healing laparoscopic incision sites    Genitourinary:     Comments: Normal external female genitalia without lesions or masses  Speculum exam: Smooth vagina without lesions or masses  Bimanual exam: smooth vagina without lesions or masses, surgically absent uterus, cervix, adnexa      Musculoskeletal:         General: No swelling. Normal range of motion.      Cervical back: Normal range of motion.   Skin:     General: Skin is warm and dry.   Neurological:      General: No focal deficit present.      Mental Status: She is alert and oriented to person, place, and time.   Psychiatric:         Mood and Affect: Mood normal.         Behavior: Behavior normal.       No visits with results within 3 Week(s) from this visit.   Latest known visit with results is:   Lab on 02/09/2024   Component Date Value Ref Range Status    Cholesterol 02/09/2024 154  0 - 199 mg/dL Final          Age      Desirable   Borderline High   High     0-19 Y     0 - 169       170 - 199     >/= 200    20-24 Y     0 - 189       190 - 224     >/= 225         >24 Y     0 - 199       200 - 239     >/= 240   **All ranges are based on fasting samples. Specific   therapeutic targets will vary based on patient-specific   cardiac risk.    Pediatric guidelines reference:Pediatrics 2011, 128(S5).Adult guidelines reference: NCEP ATPIII Guidelines,MARITA 2001, 258:2486-97    Venipuncture immediately after or during the administration of  Metamizole may lead to falsely low results. Testing should be performed immediately prior to Metamizole dosing.    HDL-Cholesterol 02/09/2024 51.4  mg/dL Final      Age       Very Low   Low     Normal    High    0-19 Y    < 35      < 40     40-45     ----  20-24 Y    ----     < 40      >45      ----        >24 Y      ----     < 40     40-60      >60      Cholesterol/HDL Ratio 02/09/2024 3.0   Final      Ref Values  Desirable  < 3.4  High Risk  > 5.0    LDL Calculated 02/09/2024 82  <=99 mg/dL Final                                Near   Borderline      AGE      Desirable  Optimal    High     High     Very High     0-19 Y     0 - 109     ---    110-129   >/= 130     ----    20-24 Y     0 - 119     ---    120-159   >/= 160     ----      >24 Y     0 -  99   100-129  130-159   160-189     >/=190      VLDL 02/09/2024 20  0 - 40 mg/dL Final    Triglycerides 02/09/2024 101  0 - 149 mg/dL Final       Age         Desirable   Borderline High   High     Very High   0 D-90 D    19 - 174         ----         ----        ----  91 D- 9 Y     0 -  74        75 -  99     >/= 100      ----    10-19 Y     0 -  89        90 - 129     >/= 130      ----    20-24 Y     0 - 114       115 - 149     >/= 150      ----         >24 Y     0 - 149       150 - 199    200- 499    >/= 500    Venipuncture immediately after or during the administration of Metamizole may lead to falsely low results. Testing should be performed immediately prior to Metamizole dosing.    Non HDL Cholesterol 02/09/2024 103  0 - 149 mg/dL Final          Age       Desirable   Borderline High   High     Very High     0-19 Y     0 - 119       120 - 144     >/= 145    >/= 160    20-24 Y     0 - 149       150 - 189     >/= 190      ----         >24 Y    30 mg/dL above LDL Cholesterol goal          Performance Status:  Asymptomatic    Assessment/Plan     Oncology History Overview Note   - 10/16/23: TLH/BSO/SLN 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, MMRp,  p53wt, LVSI neg. Plan for Surveillance      Endometrial cancer determined by uterine biopsy (Multi)   10/30/2023 Initial Diagnosis    Endometrial cancer determined by uterine biopsy (CMS/HCC)       58 y.o. 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      Scribe Attestation  By signing my name below, I, Bella Carreon   attest that this documentation has been prepared under the direction and in the presence of Tonya Martinez MD.     Provider Attestation - Scribe documentation    All medical record entries made by the Scribe were at my direction and personally dictated by me. I have reviewed the chart and agree that the record accurately reflects my personal performance of the history, physical exam, discussion and plan.    Tonya Martinez MD

## 2024-07-11 ENCOUNTER — OFFICE VISIT (OUTPATIENT)
Dept: GYNECOLOGIC ONCOLOGY | Facility: CLINIC | Age: 59
End: 2024-07-11
Payer: COMMERCIAL

## 2024-07-11 VITALS
DIASTOLIC BLOOD PRESSURE: 73 MMHG | RESPIRATION RATE: 18 BRPM | OXYGEN SATURATION: 96 % | TEMPERATURE: 96.1 F | WEIGHT: 178.9 LBS | HEART RATE: 61 BPM | SYSTOLIC BLOOD PRESSURE: 112 MMHG | BODY MASS INDEX: 34.36 KG/M2

## 2024-07-11 DIAGNOSIS — Z85.42 ENCOUNTER FOR FOLLOW-UP SURVEILLANCE OF UTERINE CANCER: ICD-10-CM

## 2024-07-11 DIAGNOSIS — C54.1 ENDOMETRIAL CANCER DETERMINED BY UTERINE BIOPSY (MULTI): Primary | ICD-10-CM

## 2024-07-11 DIAGNOSIS — Z08 ENCOUNTER FOR FOLLOW-UP SURVEILLANCE OF UTERINE CANCER: ICD-10-CM

## 2024-07-11 PROCEDURE — 3008F BODY MASS INDEX DOCD: CPT | Performed by: STUDENT IN AN ORGANIZED HEALTH CARE EDUCATION/TRAINING PROGRAM

## 2024-07-11 PROCEDURE — 1036F TOBACCO NON-USER: CPT | Performed by: STUDENT IN AN ORGANIZED HEALTH CARE EDUCATION/TRAINING PROGRAM

## 2024-07-11 PROCEDURE — 99214 OFFICE O/P EST MOD 30 MIN: CPT | Performed by: STUDENT IN AN ORGANIZED HEALTH CARE EDUCATION/TRAINING PROGRAM

## 2024-07-11 ASSESSMENT — PAIN SCALES - GENERAL: PAINLEVEL: 0-NO PAIN

## 2024-07-19 ENCOUNTER — APPOINTMENT (OUTPATIENT)
Dept: INTEGRATIVE MEDICINE | Facility: CLINIC | Age: 59
End: 2024-07-19
Payer: COMMERCIAL

## 2024-07-23 ENCOUNTER — APPOINTMENT (OUTPATIENT)
Dept: PRIMARY CARE | Facility: CLINIC | Age: 59
End: 2024-07-23
Payer: COMMERCIAL

## 2024-07-23 VITALS
HEIGHT: 61 IN | HEART RATE: 58 BPM | DIASTOLIC BLOOD PRESSURE: 78 MMHG | BODY MASS INDEX: 33.61 KG/M2 | SYSTOLIC BLOOD PRESSURE: 112 MMHG | OXYGEN SATURATION: 98 % | WEIGHT: 178 LBS

## 2024-07-23 DIAGNOSIS — E66.09 CLASS 1 OBESITY DUE TO EXCESS CALORIES WITHOUT SERIOUS COMORBIDITY WITH BODY MASS INDEX (BMI) OF 34.0 TO 34.9 IN ADULT: ICD-10-CM

## 2024-07-23 DIAGNOSIS — M99.01 CERVICOTHORACIC SOMATIC DYSFUNCTION: ICD-10-CM

## 2024-07-23 DIAGNOSIS — C54.1 ENDOMETRIAL CANCER DETERMINED BY UTERINE BIOPSY (MULTI): ICD-10-CM

## 2024-07-23 DIAGNOSIS — R73.03 PREDIABETES: Primary | ICD-10-CM

## 2024-07-23 PROBLEM — E66.9 OBESITY WITH BODY MASS INDEX (BMI) OF 35.0 TO 39.9 WITHOUT COMORBIDITY: Status: RESOLVED | Noted: 2023-10-30 | Resolved: 2024-07-23

## 2024-07-23 PROBLEM — E66.811 CLASS 1 OBESITY DUE TO EXCESS CALORIES WITHOUT SERIOUS COMORBIDITY WITH BODY MASS INDEX (BMI) OF 34.0 TO 34.9 IN ADULT: Status: ACTIVE | Noted: 2024-07-23

## 2024-07-23 PROCEDURE — 1036F TOBACCO NON-USER: CPT | Performed by: NURSE PRACTITIONER

## 2024-07-23 PROCEDURE — 99214 OFFICE O/P EST MOD 30 MIN: CPT | Performed by: NURSE PRACTITIONER

## 2024-07-23 PROCEDURE — 3008F BODY MASS INDEX DOCD: CPT | Performed by: NURSE PRACTITIONER

## 2024-07-23 RX ORDER — METFORMIN HYDROCHLORIDE 1000 MG/1
1000 TABLET ORAL
Qty: 60 TABLET | Refills: 0 | Status: SHIPPED | OUTPATIENT
Start: 2024-07-23

## 2024-07-23 ASSESSMENT — PATIENT HEALTH QUESTIONNAIRE - PHQ9
2. FEELING DOWN, DEPRESSED OR HOPELESS: NOT AT ALL
1. LITTLE INTEREST OR PLEASURE IN DOING THINGS: NOT AT ALL
SUM OF ALL RESPONSES TO PHQ9 QUESTIONS 1 & 2: 0

## 2024-07-23 NOTE — PROGRESS NOTES
Subjective   Patient ID: Candi Varner is a 58 y.o. female who presents for need to discuss meds  (Former [facility] pt ).    HPI     Diet: Healthy  Exercise: regularly   Water: Lots  Nicotine: denies  Mammo: 2/22/24  Dexa: 2/22/24    Metabolic syndrome, BMI 34:   Med: Metformin  mg  States she can tolerate up to 1500 mg   Higher doses cause GI upset/diarrhea    Endometrial cancer:   10/2023 S/p  TLH/BSO/SLN     Chronic neck and LBP: Integrative Medicine    Osteopenia:  Dexa 2/22/24    Past Medical History:   Diagnosis Date    Encounter for screening for malignant neoplasm of vagina     Vaginal Pap smear    Other conditions influencing health status     In good health    Personal history of other medical treatment     History of mammogram     Past Surgical History:   Procedure Laterality Date    BILATERAL SALPINGOOPHORECTOMY      CERVICAL DISCECTOMY  2021    LAPAROSCOPIC RADICAL TOTAL HYSTERECTOMY W/ NODE BIOPSY      OTHER SURGICAL HISTORY  11/25/2019    Epidural steroid injection    OTHER SURGICAL HISTORY  11/25/2019    Hemorrhoidectomy     Social History     Socioeconomic History    Marital status:      Spouse name: Not on file    Number of children: Not on file    Years of education: Not on file    Highest education level: Not on file   Occupational History    Not on file   Tobacco Use    Smoking status: Never     Passive exposure: Never    Smokeless tobacco: Never   Vaping Use    Vaping status: Never Used   Substance and Sexual Activity    Alcohol use: Never    Drug use: Never    Sexual activity: Defer   Other Topics Concern    Not on file   Social History Narrative    Not on file     Social Determinants of Health     Financial Resource Strain: Not on file   Food Insecurity: Not on file   Transportation Needs: Not on file   Physical Activity: Not on file   Stress: Not on file   Social Connections: Not on file   Intimate Partner Violence: Not on file   Housing Stability: Not on file         Review of  "Systems    Objective   /78   Pulse 58   Ht 1.537 m (5' 0.5\")   Wt 80.7 kg (178 lb)   SpO2 98%   BMI 34.19 kg/m²     Physical Exam    Alert and oriented x 3  Appears healthy  Does not appear/sound dyspneic with conversation  Speech clear.  Hearing adequate.  Psych: Normal affect. Good judgment and insight.       Assessment/Plan     1. Prediabetes  She will trial the Metformin 1000 mg tablet.  Discussed making sure to eat healthy foods -unhealthy can increase GI symptoms   Update office regarding which Glucophage dose she would like reordered the  mg or the 1000 mg  - metFORMIN (Glucophage) 1,000 mg tablet; Take 1 tablet (1,000 mg) by mouth 2 times daily (morning and late afternoon).  Dispense: 60 tablet; Refill: 0    2. Cervicothoracic somatic dysfunction  Follow-up with specialist per their discretion/direction.     3. Endometrial cancer determined by uterine biopsy (Multi)  Follow-up with specialist per their discretion/direction.     4. Class 1 obesity due to excess calories without serious comorbidity with body mass index (BMI) of 34.0 to 34.9 in adult  Patient encouraged to follow diet of lower carbohydrates and increase vegetable and fruit intake.   Patient also encouraged to increase water intake to 80 ounces/day.   Start or continue exercise for at least 30 minutes a day on most days of the week.     offers various ways to learn about healthy eating and healthy weight loss.     Follow up: CPE after 2/8/25    Medications refills will be completed as discussed.     Any labs or testing that is ordered will be reviewed and the results will be in your chart .   You can review these via  [employer].     Prescriptions will not be filled unless you are compliant with your follow-up appointments or have a follow-up appointment scheduled as per the instruction of your provider. Refills for medications should be requested at the time of your office visit.     Please allow one week for refill requests " to be completed.     Contact office with any questions or concerns.   Preferred communication is via  [employer]      Call  Services: [phone] to assist with scheduling.      Debi Rod APRGISELPampa Regional Medical Center [facility]  [address], [address]  [place] [address]  Phone: [phone]    **Charting was completed using voice recognition technology and may include unintended errors**

## 2024-08-20 ENCOUNTER — APPOINTMENT (OUTPATIENT)
Dept: PRIMARY CARE | Facility: CLINIC | Age: 59
End: 2024-08-20
Payer: COMMERCIAL

## 2024-08-29 ENCOUNTER — PATIENT MESSAGE (OUTPATIENT)
Dept: PRIMARY CARE | Facility: CLINIC | Age: 59
End: 2024-08-29
Payer: COMMERCIAL

## 2024-08-29 DIAGNOSIS — R73.03 PREDIABETES: ICD-10-CM

## 2024-08-29 RX ORDER — METFORMIN HYDROCHLORIDE 1000 MG/1
1000 TABLET ORAL
Qty: 180 TABLET | Refills: 1 | Status: SHIPPED | OUTPATIENT
Start: 2024-08-29

## 2024-09-25 ENCOUNTER — APPOINTMENT (OUTPATIENT)
Dept: RADIOLOGY | Facility: HOSPITAL | Age: 59
End: 2024-09-25
Payer: COMMERCIAL

## 2024-09-25 NOTE — PROGRESS NOTES
"  Patient ID: Candi Varner is a 58 y.o. female who presents for No chief complaint on file. (Weight loss)    Referring Provider: Pretty Cloud    Pt was referred for weight loss options   Last visit with PCP: 2/8/24    In addition to primary care:   Does patient see endocrine? 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   Has meetings all day for work.   5-6pm  makes dinner. Protein + Vegetables.    Meatloaf + green beans last night   Friday/Saturday food delivered in by [employer]   Occasionally Diet Coke, when she feels she is tired/needs energy  7pm [facility] 3-4x/week  150 minutes/week    Weights/Core strength- 10-12 machines, then bike w/ arms that move too (30 mins), infrared sauna (15 mins)  Evenings spent on phone/tablet prior to bedtime.   Would like to transition to reading a book instead of screens.   Barrier, gets sucked in on social media. Prior work was on social media, she no longer needs to be on social, but feels addicted.   Recommended deleting social media off of her phone.   She has a new phone and plans to not add social media.   Goal to stop screen time with sun set  Get sunlight in the eyes for at least 10-15 minutes each morning before work.   Her work set up currently has a window behind her, considering rearranging so that the window is in front of her.   Bed 11:30pm  Sleep: \"Not great\"   Takes 50 mg diphenhydramine nightly, started years ago, premenopausal.   Falls asleep within 20 minutes, but sleeps 2-3 hours, then wakes x 1-2 hours, then wants to sleep but the alarm goes off.   Feels groggy when waking  Has started not taking it on the weekends, past 4 weekends.   Able to " sleep in later (10 am)  Denies waking due to urination, mind racing, or night sweats.   Room temperature 68-70.   Recommend cooler if possible.       Alcohol/Tobacco/Other Drugs:   2-3 glasses of wine/week  Recommend avoidance.    Goal to loose 75 pounds.    Current 185-190 lbs.   Gradually started to gain in 40's  Lost 50 lbs with starvation diet (early 50's), after stopping gradually came back on.   Last 115 lbs in mid-30's   Same weight x3 years, not gaining, not losing.     Recent scare:   Uterine cancer, had surgery (last October), no chemo      Preferred Pharmacy:    [facility]/pharmacy #[id number] - [place], [place] - [address] [place] [id number]  Phone: [phone] Fax: [phone]    [facility]  [address], [address]  [facility] [address]  Phone: [phone] Fax: [phone]      Adherence/Organization:  Do you have copays on your medications? yes  Do you have trouble affording your current medications? no    HPI    Objective     There were no vitals taken for this visit.     Labs  Lab Results   Component Value Date    BILITOT 0.6 02/08/2024    CALCIUM 9.3 02/08/2024    CO2 29 02/08/2024     02/08/2024    CREATININE 0.79 02/08/2024    GLUCOSE 96 02/08/2024    ALKPHOS 58 02/08/2024    K 4.1 02/08/2024    PROT 7.3 02/08/2024     02/08/2024    AST 23 02/08/2024    ALT 23 02/08/2024    BUN 9 02/08/2024    ANIONGAP 11 02/08/2024    MG 2.18 10/17/2023    ALBUMIN 4.3 02/08/2024    GFRF 72 01/24/2023     Lab Results   Component Value Date    TRIG 101 02/09/2024    CHOL 154 02/09/2024    LDLCALC 82 02/09/2024    HDL 51.4 02/09/2024     Lab Results   Component Value Date    HGBA1C 5.8 (H) 02/08/2024    HGBA1C 5.8 (A) 01/24/2023    HGBA1C 5.8 (A) 11/04/2021     The 10-year ASCVD risk score (Tripp THOMPSON, et al., 2019) is: 2.2%    Values used to calculate the score:      Age: 58 years      Sex: Female      Is Non- :  No      Diabetic: No      Tobacco smoker: No      Systolic Blood Pressure: 112 mmHg      Is BP treated: No      HDL Cholesterol: 51.4 mg/dL      Total Cholesterol: 154 mg/dL  Lab Results   Component Value Date    VITD25 27 (L) 02/08/2024       Current Outpatient Medications on File Prior to Visit   Medication Sig Dispense Refill    cholecalciferol (Vitamin D-3) 25 MCG (1000 UT) capsule Take 4 capsules (100 mcg) by mouth once daily. 4000 international units daily      metFORMIN (Glucophage) 1,000 mg tablet Take 1 tablet (1,000 mg) by mouth 2 times daily (morning and late afternoon). 180 tablet 1    multivit-min/ferrous fumarate (MULTI VITAMIN ORAL) Take by mouth.      omeprazole (PriLOSEC) 20 mg DR capsule Take 1 capsule (20 mg) by mouth once daily in the morning. Take before meals.       No current facility-administered medications on file prior to visit.      Medication and allergy reconciliation completed     Drug Interactions   No significant drug interactions identified    Assessment/Plan   Problem List Items Addressed This Visit    None  Patient lost 15 lbs initially, hasn't been going to the gym because of busy schedule.     PCP is now managing metformin.     Since Pretty has left she hasn't seen anyone in integrative medicine.      Follow-up: as needed     Time spent with pt: Total length of time 15 (minutes) of the encounter and more than 50% was spent counseling the patient.      Vanesa Schultz, Pharm.D, [facility], [facility]  Clinical Pharmacist  Pharmacy Services  [phone]    Continue all meds under the continuation of care with the referring provider and clinical pharmacy team.    Verbal consent to manage patient's drug therapy was obtained from the patient and/or an individual authorized to act on behalf of a patient. They were informed they may decline to participate or withdraw from participation in pharmacy services at any time.

## 2024-09-26 ENCOUNTER — APPOINTMENT (OUTPATIENT)
Dept: PHARMACY | Facility: HOSPITAL | Age: 59
End: 2024-09-26
Payer: COMMERCIAL

## 2024-09-26 DIAGNOSIS — E66.09 CLASS 1 OBESITY DUE TO EXCESS CALORIES WITHOUT SERIOUS COMORBIDITY WITH BODY MASS INDEX (BMI) OF 34.0 TO 34.9 IN ADULT: Primary | ICD-10-CM

## 2025-01-22 NOTE — PROGRESS NOTES
Patient ID: Candi Varner is a 59 y.o. female.  Referring Physician: No referring provider defined for this encounter.  Primary Care Provider: SUKUMAR Gan    Subjective    Interval History:  Patient states that she is doing well. She has no acute complaints today. She denies any changes in appetite, bowel movements, or urination.  She is working with her primary care doctor on weight loss and recently started metformin. Also notes pain with intercourse and decreased libido. She notes that she and her partner have been using lubricants, engage in foreplay, and in general feels they have a good relationship with open communication. She is interested in vaginal estrogen.    She denies fever, chills, chest pain, SOB, nausea, vomiting, diarrhea, constipation, dysuria, leg swelling or any other concerning signs of symptoms.      PMH: Sciatica       Past Surgical History: Cervical Disc replacement, Transvaginal mesh       Family History: Father diagnosed with unknown Colon cancer @ 84 , Mother with   hyst for unknown gynecological lesion, Brother w/ unknown cervical cancer.   Otherwise denies a history of gyn related cancers including ovarian,   endometrial, breast, pancreas, and GI cancers.       Social History: Denies a history of smoking, alcohol use or recreational drug use.  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  Entered menopause at 55 year old.  She   has used OCP for 10-13 years.  She has  never used HRT.       Allergies: NKDA     Meds: none     Social: denies t/e/i     Review of Systems   All other systems reviewed and are negative.    Objective    BSA: 1.84 meters squared  /81 (BP Location: Right arm, Patient Position: Sitting, BP Cuff Size: Large adult)   Pulse 71   Temp 36.9 °C (98.4 °F) (Core)   Resp 18   Wt 78.9 kg (174 lb)   SpO2 95%   BMI 33.42 kg/m²      Physical Exam  Constitutional:       General: She is not  in acute distress.     Appearance: Normal appearance. She is normal weight. She is not toxic-appearing.   HENT:      Head: Normocephalic.      Mouth/Throat:      Mouth: Mucous membranes are moist.      Pharynx: Oropharynx is clear.   Eyes:      Extraocular Movements: Extraocular movements intact.      Conjunctiva/sclera: Conjunctivae normal.      Pupils: Pupils are equal, round, and reactive to light.   Cardiovascular:      Rate and Rhythm: Normal rate and regular rhythm.      Heart sounds: Normal heart sounds. No murmur heard.     No friction rub. No gallop.   Pulmonary:      Effort: Pulmonary effort is normal.      Breath sounds: Normal breath sounds. No wheezing or rhonchi.   Abdominal:      General: Abdomen is flat. Bowel sounds are normal. There is no distension.      Palpations: Abdomen is soft.      Tenderness: There is no abdominal tenderness.      Comments: Well healing laparoscopic incision sites    Genitourinary:     Comments: Normal external female genitalia without lesions or masses  Speculum exam: Smooth vagina without lesions or masses  Bimanual exam: smooth vagina without lesions or masses, surgically absent uterus, cervix, adnexa      Musculoskeletal:         General: No swelling. Normal range of motion.      Cervical back: Normal range of motion.   Skin:     General: Skin is warm and dry.   Neurological:      General: No focal deficit present.      Mental Status: She is alert and oriented to person, place, and time.   Psychiatric:         Mood and Affect: Mood normal.         Behavior: Behavior normal.       No visits with results within 3 Week(s) from this visit.   Latest known visit with results is:   Lab on 02/09/2024   Component Date Value Ref Range Status    Cholesterol 02/09/2024 154  0 - 199 mg/dL Final          Age      Desirable   Borderline High   High     0-19 Y     0 - 169       170 - 199     >/= 200    20-24 Y     0 - 189       190 - 224     >/= 225         >24 Y     0 - 199       200  - 239     >/= 240   **All ranges are based on fasting samples. Specific   therapeutic targets will vary based on patient-specific   cardiac risk.    Pediatric guidelines reference:Pediatrics 2011, 128(S5).Adult guidelines reference: NCEP ATPIII Guidelines,MARITA 2001, 258:2486-97    Venipuncture immediately after or during the administration of Metamizole may lead to falsely low results. Testing should be performed immediately prior to Metamizole dosing.    HDL-Cholesterol 02/09/2024 51.4  mg/dL Final      Age       Very Low   Low     Normal    High    0-19 Y    < 35      < 40     40-45     ----  20-24 Y    ----     < 40      >45      ----        >24 Y      ----     < 40     40-60      >60      Cholesterol/HDL Ratio 02/09/2024 3.0   Final      Ref Values  Desirable  < 3.4  High Risk  > 5.0    LDL Calculated 02/09/2024 82  <=99 mg/dL Final                                Near   Borderline      AGE      Desirable  Optimal    High     High     Very High     0-19 Y     0 - 109     ---    110-129   >/= 130     ----    20-24 Y     0 - 119     ---    120-159   >/= 160     ----      >24 Y     0 -  99   100-129  130-159   160-189     >/=190      VLDL 02/09/2024 20  0 - 40 mg/dL Final    Triglycerides 02/09/2024 101  0 - 149 mg/dL Final       Age         Desirable   Borderline High   High     Very High   0 D-90 D    19 - 174         ----         ----        ----  91 D- 9 Y     0 -  74        75 -  99     >/= 100      ----    10-19 Y     0 -  89        90 - 129     >/= 130      ----    20-24 Y     0 - 114       115 - 149     >/= 150      ----         >24 Y     0 - 149       150 - 199    200- 499    >/= 500    Venipuncture immediately after or during the administration of Metamizole may lead to falsely low results. Testing should be performed immediately prior to Metamizole dosing.    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 Plan for Surveillance      Endometrial cancer determined by uterine biopsy (Multi)   10/30/2023 Initial Diagnosis    Endometrial cancer determined by uterine biopsy (CMS/[facility])       59 y.o. 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 s/p TLH/BSO/SLN in 10/2023, here for surveillance    # Endometrial cancer  - Discussed the significance of histologic subtype, grade and stage  - Pathology reviewed as well as my recommendation for surveillance  - We reviewed signs and symptoms of recurrence   - We reviewed surveillance schedule  - Plan for surveillance visit in 6 months    # Genitourinary syndrome of Menopause  - Premarin prescribed     # Dyspareunia  - Discussed lubrication, PFPT, estrogen cream and referral to sexual health clinic  - She is interested in starting with estrogen cream     # Patient noted severe depression with Tramadol, would not like to receive that Rx in the future.    Tonya Martinez MD

## 2025-01-23 ENCOUNTER — OFFICE VISIT (OUTPATIENT)
Dept: GYNECOLOGIC ONCOLOGY | Facility: CLINIC | Age: 60
End: 2025-01-23
Payer: COMMERCIAL

## 2025-01-23 VITALS
HEART RATE: 71 BPM | DIASTOLIC BLOOD PRESSURE: 81 MMHG | TEMPERATURE: 98.4 F | SYSTOLIC BLOOD PRESSURE: 138 MMHG | WEIGHT: 174 LBS | RESPIRATION RATE: 18 BRPM | BODY MASS INDEX: 33.42 KG/M2 | OXYGEN SATURATION: 95 %

## 2025-01-23 DIAGNOSIS — N95.8 GENITOURINARY SYNDROME OF MENOPAUSE: Primary | ICD-10-CM

## 2025-01-23 DIAGNOSIS — C54.1 ENDOMETRIAL CANCER DETERMINED BY UTERINE BIOPSY (MULTI): ICD-10-CM

## 2025-01-23 DIAGNOSIS — Z08 ENCOUNTER FOR FOLLOW-UP SURVEILLANCE OF UTERINE CANCER: ICD-10-CM

## 2025-01-23 DIAGNOSIS — Z85.42 ENCOUNTER FOR FOLLOW-UP SURVEILLANCE OF UTERINE CANCER: ICD-10-CM

## 2025-01-23 PROCEDURE — 99214 OFFICE O/P EST MOD 30 MIN: CPT | Performed by: STUDENT IN AN ORGANIZED HEALTH CARE EDUCATION/TRAINING PROGRAM

## 2025-01-23 PROCEDURE — 1036F TOBACCO NON-USER: CPT | Performed by: STUDENT IN AN ORGANIZED HEALTH CARE EDUCATION/TRAINING PROGRAM

## 2025-01-23 ASSESSMENT — PAIN SCALES - GENERAL: PAINLEVEL_OUTOF10: 0-NO PAIN

## 2025-02-13 ENCOUNTER — APPOINTMENT (OUTPATIENT)
Dept: PRIMARY CARE | Facility: CLINIC | Age: 60
End: 2025-02-13
Payer: COMMERCIAL

## 2025-02-13 VITALS
BODY MASS INDEX: 33.42 KG/M2 | OXYGEN SATURATION: 100 % | DIASTOLIC BLOOD PRESSURE: 68 MMHG | HEART RATE: 62 BPM | HEIGHT: 61 IN | WEIGHT: 177 LBS | SYSTOLIC BLOOD PRESSURE: 122 MMHG

## 2025-02-13 DIAGNOSIS — M85.80 OSTEOPENIA, UNSPECIFIED LOCATION: ICD-10-CM

## 2025-02-13 DIAGNOSIS — Z12.31 VISIT FOR SCREENING MAMMOGRAM: ICD-10-CM

## 2025-02-13 DIAGNOSIS — Z00.00 HEALTHCARE MAINTENANCE: Primary | ICD-10-CM

## 2025-02-13 DIAGNOSIS — R73.03 PREDIABETES: ICD-10-CM

## 2025-02-13 DIAGNOSIS — E55.9 VITAMIN D DEFICIENCY: ICD-10-CM

## 2025-02-13 DIAGNOSIS — C54.1 ENDOMETRIAL CANCER DETERMINED BY UTERINE BIOPSY (MULTI): ICD-10-CM

## 2025-02-13 PROCEDURE — 3008F BODY MASS INDEX DOCD: CPT | Performed by: NURSE PRACTITIONER

## 2025-02-13 PROCEDURE — 1036F TOBACCO NON-USER: CPT | Performed by: NURSE PRACTITIONER

## 2025-02-13 PROCEDURE — 99396 PREV VISIT EST AGE 40-64: CPT | Performed by: NURSE PRACTITIONER

## 2025-02-13 PROCEDURE — 99214 OFFICE O/P EST MOD 30 MIN: CPT | Performed by: NURSE PRACTITIONER

## 2025-02-13 RX ORDER — ERGOCALCIFEROL 1.25 MG/1
50000 CAPSULE ORAL
Qty: 13 CAPSULE | Refills: 3 | Status: SHIPPED | OUTPATIENT
Start: 2025-02-13

## 2025-02-13 RX ORDER — METFORMIN HYDROCHLORIDE 1000 MG/1
1000 TABLET ORAL
Qty: 180 TABLET | Refills: 3 | Status: SHIPPED | OUTPATIENT
Start: 2025-02-13

## 2025-02-13 ASSESSMENT — PATIENT HEALTH QUESTIONNAIRE - PHQ9
1. LITTLE INTEREST OR PLEASURE IN DOING THINGS: NOT AT ALL
SUM OF ALL RESPONSES TO PHQ9 QUESTIONS 1 & 2: 0
2. FEELING DOWN, DEPRESSED OR HOPELESS: NOT AT ALL

## 2025-02-13 NOTE — PROGRESS NOTES
Annual Comprehensive Medical Exam:    59 y.o. female presents for annual comprehensive medical evaluation and preventive services screening.      Recent hospitalizations, surgeries or ER visits: denies     Diet:  healthy        Caffeine: 1 every 3-4 days   Water per day: lots  Exercise: regularly   Alcohol: 1 per week   Tobacco: denies   Pap/Pelvic: Gyn Onco Dr Tonya Martinez  Mammogram: 2/24, ordered  DEXA: 2/24 Osteopenia   Colonoscopy:   date   2/22 Dr Brittany Garner repeat 2027  EGD: 4/2023 Dr Brittany Garner     Allowed to report any questions or concerns.    Dysphagia:   EGD: 4/2023 Dr Brittany Garner     10/23 Stage IA grade 1 endometrioid endometrial adenocarcinoma: Gyn Onco Dr Tonya Martinez  10/16/23  TLH/BSO/SLN     Menopausal symptoms: Gyn Onco Dr Tonya Martinez  Med: Premarin (too expensive) New one to be ordered     Prediabetes:   Med:   Metformin 1000 mg bid   Tolerating without side effects   2/8/24 hga1c 5.8%    Osteopenia:   Dexa 2/24    Weight loss: Pharm D Vanesa Schultz  Med: Metformin     Integrative Med: [place] PA-C    H/o kidney stones several years ago     Past Medical History:   Diagnosis Date    Encounter for screening for malignant neoplasm of vagina     Vaginal Pap smear    Other conditions influencing health status     In good health    Personal history of other medical treatment     History of mammogram      Past Surgical History:   Procedure Laterality Date    BILATERAL SALPINGOOPHORECTOMY      CERVICAL DISCECTOMY  2021    LAPAROSCOPIC RADICAL TOTAL HYSTERECTOMY W/ NODE BIOPSY      OTHER SURGICAL HISTORY  11/25/2019    Epidural steroid injection    OTHER SURGICAL HISTORY  11/25/2019    Hemorrhoidectomy     Family History   Problem Relation Name Age of Onset    Diabetes Mother      Diabetes Father      Colon cancer Father        Social History     Socioeconomic History    Marital status:      Spouse name: Pancho Varner    Number of children: 2    Years of education: Not on  "file    Highest education level: Not on file   Occupational History    Not on file   Tobacco Use    Smoking status: Never     Passive exposure: Never    Smokeless tobacco: Never   Vaping Use    Vaping status: Never Used   Substance and Sexual Activity    Alcohol use: Never    Drug use: Never    Sexual activity: Defer   Other Topics Concern    Not on file   Social History Narrative    Not on file     Social Drivers of Health     Financial Resource Strain: Not on file   Food Insecurity: Not on file   Transportation Needs: Not on file   Physical Activity: Not on file   Stress: Not on file   Social Connections: Not on file   Intimate Partner Violence: Not on file   Housing Stability: Not on file       Current Outpatient Medications on File Prior to Visit   Medication Sig Dispense Refill    cholecalciferol (Vitamin D-3) 25 MCG (1000 UT) capsule Take 4 capsules (100 mcg) by mouth once daily. 4000 international units daily      estrogens, conjugated, (Premarin) vaginal cream Insert 0.5 g into the vagina 2 times a week. Place a pea sized amount on your finger and rub in opening of the vagina 2 times per week 4 g 11    metFORMIN (Glucophage) 1,000 mg tablet Take 1 tablet (1,000 mg) by mouth 2 times daily (morning and late afternoon). 180 tablet 1    multivit-min/ferrous fumarate (MULTI VITAMIN ORAL) Take by mouth.      omeprazole (PriLOSEC) 20 mg DR capsule Take 1 capsule (20 mg) by mouth once daily in the morning. Take before meals.       No current facility-administered medications on file prior to visit.       Allergies   Allergen Reactions    Tramadol Other     Notes severe depression while taking tramadol          Visit Vitals  /68   Pulse 62   Ht 1.537 m (5' 0.5\")   Wt 80.3 kg (177 lb)   SpO2 100%   BMI 34.00 kg/m²   OB Status Hysterectomy   Smoking Status Never   BSA 1.85 m²        Physical Exam  Gen: Alert and oriented x3 female in no acute distress.  HEENT: Head is normocephalic.  Neck is supple without " carotid bruits  Heart: Regular rate and rhythm without murmurs.  Lungs: Clear to auscultation bilaterally.  Breast:           Deferred to Gyn  Pelvic:            Deferred to Gyn   Abdomen: Soft with normal bowel sounds.  No masses or pain to palpation.    Extremities: Good range of motion of all joints.  No significant edema. Pedal pulses +1-2/4  Neuro: No signs of focal neurologic deficit.  No tremor.  Speech and hearing are normal.  DTRs +3/4;  Muscle Strength +5/5.  Musculoskeletal: Spine with good ROM.  Leg lengths are equal.  Skin: No significant or irregular nevi visualized.  Psych: normal affect.  No suicidal ideation.  Good judgment and insight.    Diagnosis/Plan:     1. Healthcare maintenance (Primary)  - Comprehensive metabolic panel  - CBC  - Lipid panel  - TSH with reflex to Free T4 if abnormal  - Urinalysis with Reflex Microscopic  - Hemoglobin A1c    2. Vitamin D deficiency  - Vitamin D 25-Hydroxy,Total (for eval of Vitamin D levels)  - ergocalciferol (Vitamin D-2) 1.25 MG (59372 UT) capsule; Take 1 capsule (50,000 Units) by mouth 1 (one) time per week.  Dispense: 13 capsule; Refill: 3    3. Prediabetes  - Hemoglobin A1c  - metFORMIN (Glucophage) 1,000 mg tablet; Take 1 tablet (1,000 mg) by mouth 2 times daily (morning and late afternoon).  Dispense: 180 tablet; Refill: 3    4. Visit for screening mammogram  - BI mammo bilateral screening tomosynthesis; Future    5. Endometrial cancer determined by uterine biopsy (Multi)  Follow-up with specialist per their discretion/direction.     6. Osteopenia, unspecified location  She has history of kidney stones so would like to take Calcium 600 mg per day  Patient should start/continue taking Calcium 1200 mg and Vit D  daily with food. Perform balance training, strength training and weight bearing exercises. Cessation of smoking and moderation of intake of alcohol, caffeine and carbonated beverages are recommended. Repeat DEXA in 2026.        Medications refills  will be completed as discussed.     Any labs or testing that is ordered will be reviewed and the results will be in your chart .   You can review these via  [employer].     Follow up 1 year for CPE    Prescriptions will not be filled unless you are compliant with your follow-up appointments or have a follow-up appointment scheduled as per the instruction of your provider. Refills for medications should be requested at the time of your office visit.     Please allow one week for refill requests to be completed.     [employer] [facility] can help with scheduling referrals: [phone]   Mammogram [phone]  Physical Therapy [phone]    Contact office with any questions or concerns.   Preferred communication is via  [employer]  Please contact [email or web address] if having issues with  [employer]    Debi [facility]  [address], [address]  [place] [address]  Phone: [phone]    **Charting was completed using voice recognition technology and may include unintended errors**

## 2025-02-18 LAB
25(OH)D3+25(OH)D2 SERPL-MCNC: 120 NG/ML (ref 30–100)
ALBUMIN SERPL-MCNC: 4.3 G/DL (ref 3.6–5.1)
ALP SERPL-CCNC: 50 U/L (ref 37–153)
ALT SERPL-CCNC: 17 U/L (ref 6–29)
ANION GAP SERPL CALCULATED.4IONS-SCNC: 10 MMOL/L (CALC) (ref 7–17)
APPEARANCE UR: CLEAR
AST SERPL-CCNC: 18 U/L (ref 10–35)
BILIRUB SERPL-MCNC: 0.6 MG/DL (ref 0.2–1.2)
BILIRUB UR QL STRIP: NEGATIVE
BUN SERPL-MCNC: 9 MG/DL (ref 7–25)
CALCIUM SERPL-MCNC: 9.2 MG/DL (ref 8.6–10.4)
CHLORIDE SERPL-SCNC: 105 MMOL/L (ref 98–110)
CHOLEST SERPL-MCNC: 175 MG/DL
CHOLEST/HDLC SERPL: 3.1 (CALC)
CO2 SERPL-SCNC: 27 MMOL/L (ref 20–32)
COLOR UR: YELLOW
CREAT SERPL-MCNC: 0.73 MG/DL (ref 0.5–1.05)
EGFRCR SERPLBLD CKD-EPI 2021: 94 ML/MIN/1.73M2
ERYTHROCYTE [DISTWIDTH] IN BLOOD BY AUTOMATED COUNT: 13.2 % (ref 11–15)
EST. AVERAGE GLUCOSE BLD GHB EST-MCNC: 114 MG/DL
EST. AVERAGE GLUCOSE BLD GHB EST-SCNC: 6.3 MMOL/L
GLUCOSE SERPL-MCNC: 88 MG/DL (ref 65–99)
GLUCOSE UR QL STRIP: NEGATIVE
HBA1C MFR BLD: 5.6 % OF TOTAL HGB
HCT VFR BLD AUTO: 43.1 % (ref 35–45)
HDLC SERPL-MCNC: 56 MG/DL
HGB BLD-MCNC: 14.4 G/DL (ref 11.7–15.5)
HGB UR QL STRIP: NEGATIVE
KETONES UR QL STRIP: NEGATIVE
LDLC SERPL CALC-MCNC: 97 MG/DL (CALC)
LEUKOCYTE ESTERASE UR QL STRIP: NEGATIVE
MCH RBC QN AUTO: 29.7 PG (ref 27–33)
MCHC RBC AUTO-ENTMCNC: 33.4 G/DL (ref 32–36)
MCV RBC AUTO: 88.9 FL (ref 80–100)
NITRITE UR QL STRIP: NEGATIVE
NONHDLC SERPL-MCNC: 119 MG/DL (CALC)
PH UR STRIP: 5.5 [PH] (ref 5–8)
PLATELET # BLD AUTO: 317 THOUSAND/UL (ref 140–400)
PMV BLD REES-ECKER: 9.9 FL (ref 7.5–12.5)
POTASSIUM SERPL-SCNC: 4 MMOL/L (ref 3.5–5.3)
PROT SERPL-MCNC: 7.2 G/DL (ref 6.1–8.1)
PROT UR QL STRIP: NEGATIVE
RBC # BLD AUTO: 4.85 MILLION/UL (ref 3.8–5.1)
SODIUM SERPL-SCNC: 142 MMOL/L (ref 135–146)
SP GR UR STRIP: 1.02 (ref 1–1.03)
TRIGL SERPL-MCNC: 127 MG/DL
TSH SERPL-ACNC: 3.21 MIU/L (ref 0.4–4.5)
WBC # BLD AUTO: 6.8 THOUSAND/UL (ref 3.8–10.8)

## 2025-02-24 ENCOUNTER — HOSPITAL ENCOUNTER (OUTPATIENT)
Dept: RADIOLOGY | Facility: CLINIC | Age: 60
Discharge: HOME | End: 2025-02-24
Payer: COMMERCIAL

## 2025-02-24 VITALS — HEIGHT: 65 IN | WEIGHT: 177 LBS | BODY MASS INDEX: 29.49 KG/M2

## 2025-02-24 DIAGNOSIS — Z12.31 VISIT FOR SCREENING MAMMOGRAM: ICD-10-CM

## 2025-02-24 PROCEDURE — 77063 BREAST TOMOSYNTHESIS BI: CPT | Performed by: RADIOLOGY

## 2025-02-24 PROCEDURE — 77067 SCR MAMMO BI INCL CAD: CPT

## 2025-02-24 PROCEDURE — 77067 SCR MAMMO BI INCL CAD: CPT | Performed by: RADIOLOGY

## 2025-02-25 DIAGNOSIS — N95.8 GENITOURINARY SYNDROME OF MENOPAUSE: Primary | ICD-10-CM

## 2025-02-25 RX ORDER — ESTRADIOL 0.1 MG/G
2 CREAM VAGINAL 2 TIMES WEEKLY
Qty: 42.5 G | Refills: 12 | Status: SHIPPED | OUTPATIENT
Start: 2025-02-27 | End: 2026-02-27

## 2025-03-28 ENCOUNTER — TELEPHONE (OUTPATIENT)
Dept: PRIMARY CARE | Facility: CLINIC | Age: 60
End: 2025-03-28
Payer: COMMERCIAL

## 2025-03-28 NOTE — TELEPHONE ENCOUNTER
Pt called confirming measles,pneumonia and shingles are covered through her insurance to be done at office. When should she schedule those vaccines? She said she doesn't want to do them all together.

## 2025-03-29 NOTE — TELEPHONE ENCOUNTER
MMR (measles) and Shingrix are live vaccines. I would not recommend doing those together.   It is up to the pt if she wants to do the Prevnar 20 (pneumonia) first , then wait at least 14 days before doing MMR or Shingrix .  Shingrix is a 2 dose series . The second one should be done anywhere between 2 to 6 months from the first.  There is a high probability of a reaction with the Shingrix vaccines . Some people prefer to schedule them close to the weekend when they may have days off.    I would probably schedule the Prevnar 20 first (pneumonia)  then schedule  the MMR (measles)  at least 14 days after getting that vaccine.  Then  we could begin the Shingrix series  14 days after doing MMR .

## 2025-04-10 ENCOUNTER — APPOINTMENT (OUTPATIENT)
Dept: PRIMARY CARE | Facility: CLINIC | Age: 60
End: 2025-04-10
Payer: COMMERCIAL

## 2025-04-10 DIAGNOSIS — Z23 NEED FOR PNEUMOCOCCAL 20-VALENT CONJUGATE VACCINATION: ICD-10-CM

## 2025-04-10 PROCEDURE — 90677 PCV20 VACCINE IM: CPT | Performed by: NURSE PRACTITIONER

## 2025-04-10 PROCEDURE — 90471 IMMUNIZATION ADMIN: CPT | Performed by: NURSE PRACTITIONER

## 2025-04-10 NOTE — PROGRESS NOTES
Pt in office today for Prevnar 20 vaccination.  0.5mL given L deltoid. Pt tolerated well and remained in waiting area for 15 minutes after receiving vaccine with no reported issues.

## 2025-05-01 ENCOUNTER — APPOINTMENT (OUTPATIENT)
Dept: PRIMARY CARE | Facility: CLINIC | Age: 60
End: 2025-05-01
Payer: COMMERCIAL

## 2025-05-01 DIAGNOSIS — Z23 NEED FOR MMR VACCINE: ICD-10-CM

## 2025-05-01 PROCEDURE — 90471 IMMUNIZATION ADMIN: CPT | Performed by: NURSE PRACTITIONER

## 2025-05-01 PROCEDURE — 90707 MMR VACCINE SC: CPT | Performed by: NURSE PRACTITIONER

## 2025-05-22 ENCOUNTER — APPOINTMENT (OUTPATIENT)
Dept: PRIMARY CARE | Facility: CLINIC | Age: 60
End: 2025-05-22
Payer: COMMERCIAL

## 2025-05-22 DIAGNOSIS — Z23 NEED FOR SHINGLES VACCINE: ICD-10-CM

## 2025-05-22 PROCEDURE — 90471 IMMUNIZATION ADMIN: CPT | Performed by: NURSE PRACTITIONER

## 2025-05-22 PROCEDURE — 90750 HZV VACC RECOMBINANT IM: CPT | Performed by: NURSE PRACTITIONER

## 2025-05-22 NOTE — PROGRESS NOTES
Pt in office for first Shingrix vaccine . 0.5mL given L deltoid. Pt tolerated well. Waited in waiting area for 15 minutes prior to leaving.  Will return in 2-6 months for second vaccination.

## 2025-06-13 ENCOUNTER — TELEMEDICINE (OUTPATIENT)
Dept: PRIMARY CARE | Facility: CLINIC | Age: 60
End: 2025-06-13
Payer: COMMERCIAL

## 2025-06-13 DIAGNOSIS — J02.9 PHARYNGITIS, UNSPECIFIED ETIOLOGY: Primary | ICD-10-CM

## 2025-06-13 PROCEDURE — 99213 OFFICE O/P EST LOW 20 MIN: CPT | Performed by: FAMILY MEDICINE

## 2025-06-13 PROCEDURE — 1036F TOBACCO NON-USER: CPT | Performed by: FAMILY MEDICINE

## 2025-06-13 RX ORDER — AMOXICILLIN 500 MG/1
500 CAPSULE ORAL EVERY 8 HOURS SCHEDULED
Qty: 30 CAPSULE | Refills: 0 | Status: SHIPPED | OUTPATIENT
Start: 2025-06-13 | End: 2025-06-23

## 2025-06-13 ASSESSMENT — ENCOUNTER SYMPTOMS
SORE THROAT: 1
COUGH: 1
HOARSE VOICE: 1

## 2025-06-13 NOTE — PROGRESS NOTES
Subjective   Patient ID: [id number]   Virtual or Telephone Consent    An interactive audio and video telecommunication system which permits real time communications between the patient (at the originating site) and provider (at the distant site) was utilized to provide this telehealth service.   Verbal consent was requested and obtained from Candi Varner on this date, 06/13/25 for a telehealth visit and the patient's location was confirmed at the time of the visit.    Candi Varner is a 59 y.o. female who presents for sore throat.  Sore Throat   This is a new problem. The current episode started yesterday. The problem has been gradually worsening. The pain is worse on the left side. There has been no fever. The pain is at a severity of 3/10. The pain is mild. Associated symptoms include coughing and a hoarse voice. She has had exposure to strep.     She complains of a sore throat. Her  and her daughter had strep throat.  She was around them.  Her sore throat started last night at 10pm.        Objective     There were no vitals taken for this visit.     Physical Exam  Constitutional:       Appearance: Normal appearance. She is not toxic-appearing or diaphoretic.   Pulmonary:      Effort: Pulmonary effort is normal. No respiratory distress.   Neurological:      General: No focal deficit present.      Mental Status: She is alert and oriented to person, place, and time.         Assessment/Plan   Problem List Items Addressed This Visit    None  Visit Diagnoses         Pharyngitis, unspecified etiology    -  Primary    Relevant Medications    amoxicillin (Amoxil) 500 mg capsule        I called in antibiotics for your sore throat.  Return in one or two weeks with Debi Rod CNP if this persists.      Dash Coley DO

## 2025-06-18 ENCOUNTER — TELEPHONE (OUTPATIENT)
Dept: PRIMARY CARE | Facility: CLINIC | Age: 60
End: 2025-06-18
Payer: COMMERCIAL

## 2025-06-18 NOTE — TELEPHONE ENCOUNTER
Patient called saying med that was given to her when she was in isnt working she still feels awful she asked if there is another medication you can send in?

## 2025-06-19 DIAGNOSIS — J06.9 UPPER RESPIRATORY TRACT INFECTION, UNSPECIFIED TYPE: Primary | ICD-10-CM

## 2025-06-19 RX ORDER — PREDNISONE 20 MG/1
40 TABLET ORAL DAILY
Qty: 10 TABLET | Refills: 0 | Status: SHIPPED | OUTPATIENT
Start: 2025-06-19 | End: 2025-06-24

## 2025-06-19 NOTE — TELEPHONE ENCOUNTER
Spoke with pt . She is still taking the Amoxicillin. She is on her 6th day .  Feels better than she did yesterday but is still experiencing a cough,  chest congestion, slight sore throat and felt as if she had a fever yesterday.  She has also been taking Mucinex DM per box instructions which provides a little relief.

## 2025-06-27 DIAGNOSIS — R05.1 ACUTE COUGH: Primary | ICD-10-CM

## 2025-06-27 RX ORDER — AZITHROMYCIN 250 MG/1
TABLET, FILM COATED ORAL
Qty: 6 TABLET | Refills: 0 | Status: SHIPPED | OUTPATIENT
Start: 2025-06-27 | End: 2025-07-02

## 2025-06-27 RX ORDER — BENZONATATE 200 MG/1
200 CAPSULE ORAL 3 TIMES DAILY PRN
Qty: 42 CAPSULE | Refills: 0 | Status: SHIPPED | OUTPATIENT
Start: 2025-06-27 | End: 2025-07-11

## 2025-07-11 ENCOUNTER — HOSPITAL ENCOUNTER (OUTPATIENT)
Dept: RADIOLOGY | Facility: CLINIC | Age: 60
Discharge: HOME | End: 2025-07-11
Payer: COMMERCIAL

## 2025-07-11 ENCOUNTER — OFFICE VISIT (OUTPATIENT)
Dept: PRIMARY CARE | Facility: CLINIC | Age: 60
End: 2025-07-11
Payer: COMMERCIAL

## 2025-07-11 VITALS
TEMPERATURE: 98.7 F | DIASTOLIC BLOOD PRESSURE: 84 MMHG | BODY MASS INDEX: 29.3 KG/M2 | OXYGEN SATURATION: 99 % | SYSTOLIC BLOOD PRESSURE: 128 MMHG | HEART RATE: 80 BPM | WEIGHT: 178 LBS | RESPIRATION RATE: 18 BRPM

## 2025-07-11 DIAGNOSIS — R73.03 PREDIABETES: Primary | ICD-10-CM

## 2025-07-11 DIAGNOSIS — R05.8 POST-VIRAL COUGH SYNDROME: ICD-10-CM

## 2025-07-11 PROCEDURE — 71046 X-RAY EXAM CHEST 2 VIEWS: CPT | Performed by: RADIOLOGY

## 2025-07-11 PROCEDURE — 99214 OFFICE O/P EST MOD 30 MIN: CPT

## 2025-07-11 PROCEDURE — 71046 X-RAY EXAM CHEST 2 VIEWS: CPT

## 2025-07-11 RX ORDER — METHYLPREDNISOLONE 4 MG/1
TABLET ORAL
Qty: 21 TABLET | Refills: 0 | Status: SHIPPED | OUTPATIENT
Start: 2025-07-11 | End: 2025-07-17

## 2025-07-11 RX ORDER — ALBUTEROL SULFATE 90 UG/1
2 INHALANT RESPIRATORY (INHALATION) EVERY 4 HOURS PRN
Qty: 8.5 G | Refills: 0 | Status: SHIPPED | OUTPATIENT
Start: 2025-07-11 | End: 2026-07-11

## 2025-07-14 ASSESSMENT — ENCOUNTER SYMPTOMS: COUGH: 1

## 2025-07-14 NOTE — PROGRESS NOTES
Subjective   Patient ID: Candi Varner is a 59 y.o. female who presents for Cough (PCP Debi. Has been coughing since 6/12/25. She has been on 2 rounds of ATB and steroids./Symptoms now is still a productive cough.).    Cough       History of Present Illness  The patient is a 59-year-old female who presents today with a primary reason for the visit of a persistent cough.    She has been experiencing this cough for the past month, which has not shown any signs of improvement. A virtual visit was conducted with another clinician, and she was prescribed Z-Shimon at that time. She reports no shortness of breath or feeling unwell/malaise. Although she has not undergone a COVID-19 test herself, her , who also has a cough, tested negative. Her daughter, who works from home, also had a cough but has since recovered. She has no history of smoking or asthma and reports no fever. The cough is productive, and she is currently taking benzonatate, nearing the end of her prescription.    She does not monitor her blood sugar levels at home as she has never needed to. She is currently on metformin for weight control, which has slightly lowered her blood sugar levels.    SOCIAL HISTORY  She does not smoke.       Review of Systems   Respiratory:  Positive for cough.        Objective   /84   Pulse 80   Temp 37.1 °C (98.7 °F)   Resp 18   Wt 80.7 kg (178 lb)   SpO2 99%   BMI 29.30 kg/m²     Physical Exam  Constitutional:       General: She is not in acute distress.     Appearance: Normal appearance. She is not ill-appearing.   Eyes:      General: No scleral icterus.  Cardiovascular:      Rate and Rhythm: Normal rate and regular rhythm.      Heart sounds: No murmur heard.     No friction rub. No gallop.   Pulmonary:      Effort: Pulmonary effort is normal. No respiratory distress.      Breath sounds: Rhonchi present. No wheezing or rales.   Musculoskeletal:      Right lower leg: No edema.      Left lower leg: No edema.    Neurological:      General: No focal deficit present.      Mental Status: She is alert and oriented to person, place, and time.   Psychiatric:         Mood and Affect: Mood normal.         Behavior: Behavior normal.         Assessment/Plan     Assessment & Plan  1. Cough.  - Persistent cough for one month, possibly postviral, not improving with previous antibiotics.  - Lung examination reveals coarse breath sounds; no fever or shortness of breath reported.  - Discussion about the non-contagious nature of the cough, given the patient feels overall better and not having any ongoing fever or other sick type symptoms.  Likely represents sequela of postviral reactive process and potential postviral duration of up to 2 months.  - Medrol Dosepak prescribed; chest x-ray ordered; albuterol inhaler provided; benzonatate refill given.    2.  Insulin resistance  - Patient uses metformin for weight control, which has helped lower borderline blood sugar levels.  - No home monitoring of blood sugar levels.  Discussed with patient that systemic steroids may increase blood sugar during their use.  - Discussion about the use of metformin for weight control and its effectiveness in lowering blood sugar.  - Continued use of metformin recommended.     Patient to contact office if symptoms fail to improve or she has any acute worsening of symptoms despite initial measures or if cough persist greater than the next 1 month    Viral Lee DO       This medical note was created with the assistance of artificial intelligence (AI) for documentation purposes. The content has been reviewed and confirmed by the healthcare provider for accuracy and completeness. Patient consented to the use of audio recording and use of AI during their visit.

## 2025-07-24 ENCOUNTER — OFFICE VISIT (OUTPATIENT)
Dept: GYNECOLOGIC ONCOLOGY | Facility: CLINIC | Age: 60
End: 2025-07-24
Payer: COMMERCIAL

## 2025-07-24 VITALS
DIASTOLIC BLOOD PRESSURE: 73 MMHG | OXYGEN SATURATION: 99 % | TEMPERATURE: 98.1 F | BODY MASS INDEX: 29.42 KG/M2 | WEIGHT: 178.7 LBS | SYSTOLIC BLOOD PRESSURE: 122 MMHG | HEART RATE: 70 BPM

## 2025-07-24 DIAGNOSIS — Z08 ENCOUNTER FOR FOLLOW-UP SURVEILLANCE OF UTERINE CANCER: ICD-10-CM

## 2025-07-24 DIAGNOSIS — C54.1 ENDOMETRIAL CANCER DETERMINED BY UTERINE BIOPSY (MULTI): Primary | ICD-10-CM

## 2025-07-24 DIAGNOSIS — Z85.42 ENCOUNTER FOR FOLLOW-UP SURVEILLANCE OF UTERINE CANCER: ICD-10-CM

## 2025-07-24 PROCEDURE — 99214 OFFICE O/P EST MOD 30 MIN: CPT | Performed by: STUDENT IN AN ORGANIZED HEALTH CARE EDUCATION/TRAINING PROGRAM

## 2025-07-24 ASSESSMENT — PAIN SCALES - GENERAL: PAINLEVEL_OUTOF10: 0-NO PAIN

## 2025-07-24 NOTE — PROGRESS NOTES
Patient ID: Candi Varner is a 59 y.o. female.  Referring Physician: No referring provider defined for this encounter.  Primary Care Provider: SUKUMAR Gan    Subjective    Interval History:  No new vaginal bleeding, discharge, abdominal pain, difficulty using restroom, leg swelling. Does have cough currently, but is recovering from a viral URI. Denies fever, chills, nausea, vomiting, diarrhea, constipation, dysuria, or any other concerning signs of symptoms.      PMH: Sciatica       Past Surgical History: Cervical Disc replacement, Transvaginal mesh       Family History: Father diagnosed with unknown Colon cancer @ 84 , Mother with   hyst for unknown gynecological lesion, Brother w/ unknown cervical cancer.   Otherwise denies a history of gyn related cancers including ovarian,   endometrial, breast, pancreas, and GI cancers.       Social History: Denies a history of smoking, alcohol use or recreational drug use.  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  Entered menopause at 55 year old.  She   has used OCP for 10-13 years.  She has  never used HRT.       Allergies: NKDA     Meds: none     Social: denies t/e/i     Review of Systems   All other systems reviewed and are negative.    Objective    BSA: 1.93 meters squared  /73   Pulse 70   Temp 36.7 °C (98.1 °F)   Wt 81.1 kg (178 lb 11.2 oz)   SpO2 99%   BMI 29.42 kg/m²      Physical Exam  Constitutional:       General: She is not in acute distress.     Appearance: Normal appearance. She is normal weight. She is not toxic-appearing.   HENT:      Head: Normocephalic.      Mouth/Throat:      Mouth: Mucous membranes are moist.      Pharynx: Oropharynx is clear.     Eyes:      Extraocular Movements: Extraocular movements intact.      Conjunctiva/sclera: Conjunctivae normal.      Pupils: Pupils are equal, round, and reactive to light.       Cardiovascular:      Rate and Rhythm:  Normal rate and regular rhythm.      Heart sounds: Normal heart sounds. No murmur heard.     No friction rub. No gallop.   Pulmonary:      Effort: Pulmonary effort is normal.      Breath sounds: Normal breath sounds. No wheezing or rhonchi.   Abdominal:      General: Abdomen is flat. Bowel sounds are normal. There is no distension.      Palpations: Abdomen is soft.      Tenderness: There is no abdominal tenderness.      Comments: Well healed laparoscopic incision sites    Genitourinary:     Comments: Normal external female genitalia without lesions or masses  Speculum exam: Smooth vagina without lesions or masses  Bimanual exam: smooth vagina without lesions or masses, surgically absent uterus, cervix, adnexa    Musculoskeletal:         General: No swelling. Normal range of motion.      Cervical back: Normal range of motion.     Skin:     General: Skin is warm and dry.     Neurological:      General: No focal deficit present.      Mental Status: She is alert and oriented to person, place, and time.     Psychiatric:         Mood and Affect: Mood normal.         Behavior: Behavior normal.       No visits with results within 3 Week(s) from this visit.   Latest known visit with results is:   Orders Only on 02/17/2025   Component Date Value Ref Range Status    CHOLESTEROL, TOTAL 02/17/2025 175  <200 mg/dL Final    HDL CHOLESTEROL 02/17/2025 56  > OR = 50 mg/dL Final    TRIGLYCERIDES 02/17/2025 127  <150 mg/dL Final    LDL-CHOLESTEROL 02/17/2025 97  mg/dL (calc) Final    Comment: Reference range: <100     Desirable range <100 mg/dL for primary prevention;    <70 mg/dL for patients with CHD or diabetic patients   with > or = 2 CHD risk factors.     LDL-C is now calculated using the Bradly   calculation, which is a validated novel method providing   better accuracy than the Friedewald equation in the   estimation of LDL-C.   Jesse JONES et al. MARITA. 2013;310(19): 5103-0910    (http://education.[employer]/faq/RCM640)      CHOL/HDLC RATIO 02/17/2025 3.1  <5.0 (calc) Final    NON HDL CHOLESTEROL 02/17/2025 119  <130 mg/dL (calc) Final    Comment: For patients with diabetes plus 1 major ASCVD risk   factor, treating to a non-HDL-C goal of <100 mg/dL   (LDL-C of <70 mg/dL) is considered a therapeutic   option.      GLUCOSE 02/17/2025 88  65 - 99 mg/dL Final    Comment:               Fasting reference interval         UREA NITROGEN (BUN) 02/17/2025 9  7 - 25 mg/dL Final    CREATININE 02/17/2025 0.73  0.50 - 1.05 mg/dL Final    EGFR 02/17/2025 94  > OR = 60 mL/min/1.73m2 Final    SODIUM 02/17/2025 142  135 - 146 mmol/L Final    POTASSIUM 02/17/2025 4.0  3.5 - 5.3 mmol/L Final    CHLORIDE 02/17/2025 105  98 - 110 mmol/L Final    CARBON DIOXIDE 02/17/2025 27  20 - 32 mmol/L Final    ELECTROLYTE BALANCE 02/17/2025 10  7 - 17 mmol/L (calc) Final    CALCIUM 02/17/2025 9.2  8.6 - 10.4 mg/dL Final    PROTEIN, TOTAL 02/17/2025 7.2  6.1 - 8.1 g/dL Final    ALBUMIN 02/17/2025 4.3  3.6 - 5.1 g/dL Final    BILIRUBIN, TOTAL 02/17/2025 0.6  0.2 - 1.2 mg/dL Final    ALKALINE PHOSPHATASE 02/17/2025 50  37 - 153 U/L Final    AST 02/17/2025 18  10 - 35 U/L Final    ALT 02/17/2025 17  6 - 29 U/L Final    WHITE BLOOD CELL COUNT 02/17/2025 6.8  3.8 - 10.8 Thousand/uL Final    RED BLOOD CELL COUNT 02/17/2025 4.85  3.80 - 5.10 Million/uL Final    HEMOGLOBIN 02/17/2025 14.4  11.7 - 15.5 g/dL Final    HEMATOCRIT 02/17/2025 43.1  35.0 - 45.0 % Final    MCV 02/17/2025 88.9  80.0 - 100.0 fL Final    MCH 02/17/2025 29.7  27.0 - 33.0 pg Final    MCHC 02/17/2025 33.4  32.0 - 36.0 g/dL Final    Comment: For adults, a slight decrease in the calculated MCHC  value (in the range of 30 to 32 g/dL) is most likely  not clinically significant; however, it should be  interpreted with caution in correlation with other  red cell parameters and the patient's clinical  condition.      RDW 02/17/2025 13.2  11.0 - 15.0 % Final     PLATELET COUNT 02/17/2025 317  140 - 400 Thousand/uL Final    MPV 02/17/2025 9.9  7.5 - 12.5 fL Final    TSH W/REFLEX TO FT4 02/17/2025 3.21  0.40 - 4.50 mIU/L Final    VITAMIN D,25-OH,TOTAL,IA 02/17/2025 120 (H)  30 - 100 ng/mL Final    Comment: Vitamin D Status         25-OH Vitamin D:     Deficiency:                    <20 ng/mL  Insufficiency:             20 - 29 ng/mL  Optimal:                 > or = 30 ng/mL     For 25-OH Vitamin D testing on patients on   D2-supplementation and patients for whom quantitation   of D2 and D3 fractions is required, the QuestAssureD(TM)  25-OH VIT D, (D2,D3), LC/MS/MS is recommended: order   code 29412 (patients >2yrs).     See Note 1     Note 1     For additional information, please refer to   http://education.[employer]/faq/KVU199   (This link is being provided for informational/  educational purposes only.)      HEMOGLOBIN A1c 02/17/2025 5.6  <5.7 % of total Hgb Final    Comment: For the purpose of screening for the presence of  diabetes:     <5.7%       Consistent with the absence of diabetes  5.7-6.4%    Consistent with increased risk for diabetes              (prediabetes)  > or =6.5%  Consistent with diabetes     This assay result is consistent with a decreased risk  of diabetes.     Currently, no consensus exists regarding use of  hemoglobin A1c for diagnosis of diabetes in children.     According to American Diabetes Association (ADA)  guidelines, hemoglobin A1c <7.0% represents optimal  control in non-pregnant diabetic patients. Different  metrics may apply to specific patient populations.   Standards of Medical Care in Diabetes(ADA).          eAG (mg/dL) 02/17/2025 114  mg/dL Final    eAG (mmol/L) 02/17/2025 6.3  mmol/L Final    COLOR 02/17/2025 YELLOW  YELLOW Final    APPEARANCE 02/17/2025 CLEAR  CLEAR Final    SPECIFIC GRAVITY 02/17/2025 1.017  1.001 - 1.035 Final    PH 02/17/2025 5.5  5.0 - 8.0 Final    GLUCOSE 02/17/2025 NEGATIVE  NEGATIVE Final     BILIRUBIN 02/17/2025 NEGATIVE  NEGATIVE Final    KETONES 02/17/2025 NEGATIVE  NEGATIVE Final    OCCULT BLOOD 02/17/2025 NEGATIVE  NEGATIVE Final    PROTEIN 02/17/2025 NEGATIVE  NEGATIVE Final    NITRITE 02/17/2025 NEGATIVE  NEGATIVE Final    LEUKOCYTE ESTERASE 02/17/2025 NEGATIVE  NEGATIVE Final        Performance Status:  Asymptomatic    Assessment/Plan     Oncology History Overview Note   - 10/16/23: TLH/BSO/SLN 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 Plan for Surveillance      Endometrial cancer determined by uterine biopsy ([facility])   10/30/2023 Initial Diagnosis    Endometrial cancer determined by uterine biopsy ([facility])       59 y.o. 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 s/p TLH/BSO/SLN in 10/2023, here for surveillance    # Endometrial cancer  - Discussed the significance of histologic subtype, grade and stage  - Pathology reviewed as well as my recommendation for surveillance  - We reviewed signs and symptoms of recurrence   - We reviewed surveillance schedule  - Exam with no evidence of disease, no concerning symptoms of recurrence  - Plan for surveillance visit in 6 months    # Genitourinary syndrome of Menopause  - Premarin prescribed     # Dyspareunia  - Discussed lubrication, PFPT, estrogen cream and referral to sexual health clinic  - Using estrogen cream     # Patient noted severe depression with Tramadol, would not like to receive that Rx in the future.    Patient seen and discussed with Dr. Juan Sultana, MS4  [facility] [employer]     I, or a resident under my supervision, was present with the medical student who participated in the documentation of this note.  I have personally seen and examined the patient and performed the medical decision-making components. I have reviewed the medical student documentation and/or resident documentation and verified the findings  in the note as written with additions or exceptions as stated in the body of the note.    Tonya Martinez MD, MS

## 2025-07-31 ENCOUNTER — APPOINTMENT (OUTPATIENT)
Dept: PRIMARY CARE | Facility: CLINIC | Age: 60
End: 2025-07-31
Payer: COMMERCIAL

## 2025-07-31 DIAGNOSIS — Z23 NEED FOR SHINGLES VACCINE: ICD-10-CM

## 2025-07-31 PROCEDURE — 90471 IMMUNIZATION ADMIN: CPT | Performed by: NURSE PRACTITIONER

## 2025-07-31 PROCEDURE — 90750 HZV VACC RECOMBINANT IM: CPT | Performed by: NURSE PRACTITIONER

## 2025-08-21 ENCOUNTER — APPOINTMENT (OUTPATIENT)
Dept: PRIMARY CARE | Facility: CLINIC | Age: 60
End: 2025-08-21
Payer: COMMERCIAL

## 2025-09-04 ENCOUNTER — APPOINTMENT (OUTPATIENT)
Dept: PRIMARY CARE | Facility: CLINIC | Age: 60
End: 2025-09-04
Payer: COMMERCIAL

## 2026-02-19 ENCOUNTER — APPOINTMENT (OUTPATIENT)
Dept: PRIMARY CARE | Facility: CLINIC | Age: 61
End: 2026-02-19
Payer: COMMERCIAL

## (undated) DEVICE — TUBE, SALEM SUMP, 16 FR X 48IN, ENFIT

## (undated) DEVICE — PREP TRAY, SKIN, DRY, W/GLOVES

## (undated) DEVICE — MANIFOLD, 4 PORT NEPTUNE STANDARD

## (undated) DEVICE — SUTURE, VICRYL, 4-0, 18 IN, UNDYED BR PS-2

## (undated) DEVICE — Device

## (undated) DEVICE — DRAPE, PAD, PREP, W/ 9 IN CUFF, 24 X 41, LF, NS

## (undated) DEVICE — SYRINGE, 60 CC, LUER LOCK, MONOJECT, W/O CAP, LF

## (undated) DEVICE — SYRINGE, W/CANNULA, VIAL ACCESS, INTERLINK, W/NEEDLE, 15 G

## (undated) DEVICE — GOWN, SURGICAL, SMARTGOWN, XLARGE, STERILE

## (undated) DEVICE — OCCLUDER, COLPO-PNEUMO

## (undated) DEVICE — SYRINGE, 35 CC, LUER LOCK, MONOJECT, W/O CAP, LF

## (undated) DEVICE — HOLSTER, JET SAFETY

## (undated) DEVICE — DRESSING, ADHESIVE, ISLAND, TELFA, 2 X 3.75 IN, LF

## (undated) DEVICE — ELECTRODE, OPTI2 LAPAROSCOPIC SPATULA, CURVED

## (undated) DEVICE — DRAPE, TIBURON W/ADHESIVE, 19 X 30

## (undated) DEVICE — TRAY, MINOR, SINGLE BASIN, STERILE

## (undated) DEVICE — PUMP, STRYKERFLOW 2 & HANDPIECE W/10FT. IRRIGATION TUBING

## (undated) DEVICE — LIGASURE, V SEALER/DIVIDER  5MM BLUNT TIP

## (undated) DEVICE — TOWEL, SURGICAL, NEURO, O/R, 16 X 26, BLUE, STERILE

## (undated) DEVICE — SUTURE, VICRYL, 0, 27 IN, UR-6, VIOLET

## (undated) DEVICE — CAUTERY, PENCIL, PUSH BUTTON, SMOKE EVAC, 70MM

## (undated) DEVICE — COVER, CART, 45 X 27 X 48 IN, CLEAR

## (undated) DEVICE — HEMOSTAT, ARISTA, ABSORBABLE, 3 GRAMS

## (undated) DEVICE — APPLICATOR, ARISTA, FLEXITIP, XL, LF

## (undated) DEVICE — REST, HEAD, BAGEL, 9 IN

## (undated) DEVICE — IRRIGATION SET, CYSTOSCOPY, F/CONSTANT/INTERMITTENT, 8 GTT/CC, 77 IN

## (undated) DEVICE — SYRINGE, LUER LOCK, 12ML